# Patient Record
Sex: FEMALE | Race: WHITE | NOT HISPANIC OR LATINO | Employment: UNEMPLOYED | URBAN - METROPOLITAN AREA
[De-identification: names, ages, dates, MRNs, and addresses within clinical notes are randomized per-mention and may not be internally consistent; named-entity substitution may affect disease eponyms.]

---

## 2017-02-01 DIAGNOSIS — F32.A DEPRESSION, UNSPECIFIED DEPRESSION TYPE: ICD-10-CM

## 2017-02-01 RX ORDER — SERTRALINE HYDROCHLORIDE 50 MG/1
50 TABLET, FILM COATED ORAL DAILY
Qty: 30 TABLET | Refills: 1 | OUTPATIENT
Start: 2017-02-01

## 2018-01-22 ENCOUNTER — OFFICE VISIT (OUTPATIENT)
Dept: OBSTETRICS AND GYNECOLOGY | Facility: CLINIC | Age: 33
End: 2018-01-22
Attending: OBSTETRICS & GYNECOLOGY
Payer: COMMERCIAL

## 2018-01-22 VITALS
SYSTOLIC BLOOD PRESSURE: 108 MMHG | DIASTOLIC BLOOD PRESSURE: 60 MMHG | HEIGHT: 67 IN | BODY MASS INDEX: 20.41 KG/M2 | WEIGHT: 130.06 LBS

## 2018-01-22 DIAGNOSIS — Z01.419 ENCOUNTER FOR GYNECOLOGICAL EXAMINATION (GENERAL) (ROUTINE) WITHOUT ABNORMAL FINDINGS: ICD-10-CM

## 2018-01-22 DIAGNOSIS — Z12.4 CERVICAL CANCER SCREENING: Primary | ICD-10-CM

## 2018-01-22 DIAGNOSIS — Z11.51 SCREENING FOR HUMAN PAPILLOMAVIRUS (HPV): ICD-10-CM

## 2018-01-22 PROCEDURE — 88175 CYTOPATH C/V AUTO FLUID REDO: CPT

## 2018-01-22 PROCEDURE — 87624 HPV HI-RISK TYP POOLED RSLT: CPT

## 2018-01-22 PROCEDURE — 99395 PREV VISIT EST AGE 18-39: CPT | Mod: S$GLB,,, | Performed by: OBSTETRICS & GYNECOLOGY

## 2018-01-22 PROCEDURE — 99999 PR PBB SHADOW E&M-EST. PATIENT-LVL III: CPT | Mod: PBBFAC,,, | Performed by: OBSTETRICS & GYNECOLOGY

## 2018-01-22 RX ORDER — BUTALBITAL, ACETAMINOPHEN AND CAFFEINE 50; 325; 40 MG/1; MG/1; MG/1
TABLET ORAL
COMMUNITY
Start: 2017-11-02 | End: 2018-04-15 | Stop reason: SDUPTHER

## 2018-01-22 NOTE — PROGRESS NOTES
Subjective:       Patient ID: Tali Brice is a 32 y.o. female.    Chief Complaint:  Well Woman (last pap 8/15 neg )      Patient Active Problem List   Diagnosis    Anxiety       History of Present Illness  32 y.o. yo No obstetric history on file. here for annual exam. New patient old file. Moved to [place] so changed to doctor over there and then changed back. Had 2 children with no issues. Has not been able to get pregnant since. Tried Clomid/Femara with other MD with no success. Pt is ovulatory on her own. Normal SA and I did HSG a while ago that was normal, one side may have been partially blocked per pt. She also has been complaining about a GI pain around her period. She has been having this complaint for years. Now seeing GI and they did EGD and it was normal. Having test this Friday for gastroparesis. Pain is better on OCP, but wants pregnancy. Off OCP now. Pt is maybe interested in progesterone only OCP in the future. Does not want to do any other fertility meds right now. Answered all questions.      I explained new pap and HPV guidelines. Will do pap and HPV test today. Will repeat pap and HPV every 3 years. Answered all questions. Patient agrees.     Past Medical History:   Diagnosis Date    Anxiety     Hx of chlamydia infection        Past Surgical History:   Procedure Laterality Date     SECTION      x2     LASER LAPAROSCOPY      for dysmenorrhea pelvic pain   scope was normal     UPPER GASTROINTESTINAL ENDOSCOPY         OB History   No data available       Patient's last menstrual period was 2018 (exact date).   Date of Last Pap: No result found    Review of Systems  Review of Systems   Constitutional: Negative for fatigue and unexpected weight change.   Respiratory: Negative for shortness of breath.    Cardiovascular: Negative for chest pain.   Gastrointestinal: Negative for abdominal pain, constipation, diarrhea, nausea and vomiting.   Genitourinary: Negative for  dysuria.   Musculoskeletal: Negative for back pain.   Skin: Negative for rash.   Neurological: Negative for headaches.   Hematological: Does not bruise/bleed easily.   Psychiatric/Behavioral: Negative for behavioral problems.        Objective:   Physical Exam:   Constitutional: She is oriented to person, place, and time. Vital signs are normal. She appears well-developed and well-nourished. No distress.        Pulmonary/Chest: She exhibits no mass. Right breast exhibits no mass, no nipple discharge, no skin change, no tenderness, no bleeding and no swelling. Left breast exhibits no mass, no nipple discharge, no skin change, no tenderness, no bleeding and no swelling. Breasts are symmetrical.        Abdominal: Soft. Normal appearance and bowel sounds are normal. She exhibits no distension and no mass. There is no tenderness. There is no rebound.     Genitourinary: Vagina normal and uterus normal. There is no rash, tenderness, lesion or injury on the right labia. There is no rash, tenderness, lesion or injury on the left labia. Uterus is not deviated, not enlarged, not fixed, not tender, not hosting fibroids and not experiencing uterine prolapse. Cervix is normal. Right adnexum displays no mass, no tenderness and no fullness. Left adnexum displays no mass, no tenderness and no fullness. No erythema, tenderness, rectocele, cystocele or unspecified prolapse of vaginal walls in the vagina. No vaginal discharge found. Cervix exhibits no motion tenderness, no discharge and no friability.           Musculoskeletal: Normal range of motion and moves all extremeties.      Lymphadenopathy:     She has no axillary adenopathy.        Right: No supraclavicular adenopathy present.        Left: No supraclavicular adenopathy present.    Neurological: She is alert and oriented to person, place, and time.    Skin: Skin is warm and dry.    Psychiatric: She has a normal mood and affect. Her behavior is normal. Judgment normal.         Assessment/ Plan:     1. Cervical cancer screening  Liquid-based pap smear, screening   2. Screening for human papillomavirus (HPV)  HPV High Risk Genotypes, PCR   3. Encounter for gynecological examination (general) (routine) without abnormal findings         Follow-up with me in 1 year

## 2018-01-25 LAB
HPV16 AG SPEC QL: NEGATIVE
HPV16+18+H RISK 12 DNA CVX-IMP: NEGATIVE
HPV18 DNA SPEC QL NAA+PROBE: NEGATIVE

## 2018-04-06 ENCOUNTER — PATIENT MESSAGE (OUTPATIENT)
Dept: OBSTETRICS AND GYNECOLOGY | Facility: CLINIC | Age: 33
End: 2018-04-06

## 2018-04-18 ENCOUNTER — PATIENT MESSAGE (OUTPATIENT)
Dept: OBSTETRICS AND GYNECOLOGY | Facility: CLINIC | Age: 33
End: 2018-04-18

## 2019-02-04 ENCOUNTER — OFFICE VISIT (OUTPATIENT)
Dept: OBSTETRICS AND GYNECOLOGY | Facility: CLINIC | Age: 34
End: 2019-02-04
Payer: COMMERCIAL

## 2019-02-04 VITALS
HEIGHT: 67 IN | DIASTOLIC BLOOD PRESSURE: 80 MMHG | BODY MASS INDEX: 20.28 KG/M2 | SYSTOLIC BLOOD PRESSURE: 102 MMHG | WEIGHT: 129.19 LBS

## 2019-02-04 DIAGNOSIS — Z01.419 ENCOUNTER FOR GYNECOLOGICAL EXAMINATION (GENERAL) (ROUTINE) WITHOUT ABNORMAL FINDINGS: Primary | ICD-10-CM

## 2019-02-04 PROCEDURE — 99999 PR PBB SHADOW E&M-EST. PATIENT-LVL III: ICD-10-PCS | Mod: PBBFAC,,, | Performed by: OBSTETRICS & GYNECOLOGY

## 2019-02-04 PROCEDURE — 99395 PR PREVENTIVE VISIT,EST,18-39: ICD-10-PCS | Mod: S$GLB,,, | Performed by: OBSTETRICS & GYNECOLOGY

## 2019-02-04 PROCEDURE — 99395 PREV VISIT EST AGE 18-39: CPT | Mod: S$GLB,,, | Performed by: OBSTETRICS & GYNECOLOGY

## 2019-02-04 PROCEDURE — 99999 PR PBB SHADOW E&M-EST. PATIENT-LVL III: CPT | Mod: PBBFAC,,, | Performed by: OBSTETRICS & GYNECOLOGY

## 2019-02-04 NOTE — PROGRESS NOTES
Subjective:       Patient ID: Tali Brice is a 33 y.o. female.    Chief Complaint:  Annual Exam (last pap/hpv 2018 normal, c/o right side of pelvic area)      Patient Active Problem List   Diagnosis    Anxiety       History of Present Illness  33 y.o. yo  here for annual exam. Still has her epigastric pain with period that lasts a couple of days. Is better than it has been in the past but still there. Also has a pain in right side of labia where crease of leg is, worse with squats.   Has heavy period for 2 days then dark spotting for over a week that is barely enough for a pantyliner.     Patient had a normal pap smear and HPV in 2018 at last annual visit. I explained new guidelines. Will repeat pap and HPV every 3 years. Answered all questions. Patient agrees.     Past Medical History:   Diagnosis Date    Anxiety     Hx of chlamydia infection        Past Surgical History:   Procedure Laterality Date     SECTION      x2     LASER LAPAROSCOPY      for dysmenorrhea pelvic pain   scope was normal     UPPER GASTROINTESTINAL ENDOSCOPY         OB History   No data available       Patient's last menstrual period was 2019 (exact date).   Date of Last Pap: 2018    Review of Systems  Review of Systems   Constitutional: Negative for fatigue and unexpected weight change.   Respiratory: Negative for shortness of breath.    Cardiovascular: Negative for chest pain.   Gastrointestinal: Negative for abdominal pain, constipation, diarrhea, nausea and vomiting.   Genitourinary: Negative for dysuria.   Musculoskeletal: Negative for back pain.   Skin: Negative for rash.   Neurological: Negative for headaches.   Hematological: Does not bruise/bleed easily.   Psychiatric/Behavioral: Negative for behavioral problems.        Objective:   Physical Exam:   Constitutional: She is oriented to person, place, and time. Vital signs are normal. She appears well-developed and well-nourished. No distress.         Pulmonary/Chest: She exhibits no mass. Right breast exhibits no mass, no nipple discharge, no skin change, no tenderness, no bleeding and no swelling. Left breast exhibits no mass, no nipple discharge, no skin change, no tenderness, no bleeding and no swelling. Breasts are symmetrical.        Abdominal: Soft. Normal appearance and bowel sounds are normal. She exhibits no distension and no mass. There is no tenderness. There is no rebound.     Genitourinary: Vagina normal and uterus normal. There is no rash, tenderness, lesion or injury on the right labia. There is no rash, tenderness, lesion or injury on the left labia. Uterus is not deviated, not enlarged, not fixed, not tender, not hosting fibroids and not experiencing uterine prolapse. Cervix is normal. Right adnexum displays no mass, no tenderness and no fullness. Left adnexum displays no mass, no tenderness and no fullness. No erythema, tenderness, rectocele, cystocele or unspecified prolapse of vaginal walls in the vagina. No vaginal discharge found. Cervix exhibits no motion tenderness, no discharge and no friability.           Musculoskeletal: Normal range of motion and moves all extremeties.      Lymphadenopathy:     She has no axillary adenopathy.        Right: No supraclavicular adenopathy present.        Left: No supraclavicular adenopathy present.    Neurological: She is alert and oriented to person, place, and time.    Skin: Skin is warm and dry.    Psychiatric: She has a normal mood and affect. Her behavior is normal. Judgment normal.        Assessment/ Plan:     1. Encounter for gynecological examination (general) (routine) without abnormal findings       Where she was pointing for vaginal pain was lateral to the right Bartholin's area. Says it is better with NSAIDs and worse with exercise and squats, likely muscular. Possibly related to Bartholin's although exam is normal.   Declines any meds for periods.   Follow-up with me in 1 year

## 2019-04-09 ENCOUNTER — PATIENT MESSAGE (OUTPATIENT)
Dept: OBSTETRICS AND GYNECOLOGY | Facility: CLINIC | Age: 34
End: 2019-04-09

## 2019-04-10 RX ORDER — FLUCONAZOLE 150 MG/1
150 TABLET ORAL DAILY
Qty: 3 TABLET | Refills: 0 | Status: SHIPPED | OUTPATIENT
Start: 2019-04-10 | End: 2019-04-13

## 2019-04-10 NOTE — TELEPHONE ENCOUNTER
Len pt- c/o vaginal itching, thinks she has a yeast infection. Would like an Rx called in to pharm.     Allergies and pharm utd  Diflucan pended

## 2019-07-28 ENCOUNTER — PATIENT MESSAGE (OUTPATIENT)
Dept: OBSTETRICS AND GYNECOLOGY | Facility: CLINIC | Age: 34
End: 2019-07-28

## 2019-07-29 RX ORDER — DROSPIRENONE AND ETHINYL ESTRADIOL 0.02-3(28)
1 KIT ORAL DAILY
Qty: 112 TABLET | Refills: 4 | Status: SHIPPED | OUTPATIENT
Start: 2019-07-29 | End: 2020-02-17 | Stop reason: SDUPTHER

## 2019-08-06 ENCOUNTER — OFFICE VISIT (OUTPATIENT)
Dept: NEUROSURGERY | Facility: CLINIC | Age: 34
End: 2019-08-06
Payer: COMMERCIAL

## 2019-08-06 VITALS — HEART RATE: 61 BPM | DIASTOLIC BLOOD PRESSURE: 70 MMHG | SYSTOLIC BLOOD PRESSURE: 105 MMHG

## 2019-08-06 DIAGNOSIS — R93.7 ABNORMAL X-RAY OF THORACIC SPINE: ICD-10-CM

## 2019-08-06 DIAGNOSIS — R93.7 ABNORMAL X-RAY OF CERVICAL SPINE: ICD-10-CM

## 2019-08-06 DIAGNOSIS — G95.89 SYRINX, PERSISTENT CENTRAL CANAL: Primary | ICD-10-CM

## 2019-08-06 PROCEDURE — 99999 PR PBB SHADOW E&M-EST. PATIENT-LVL III: ICD-10-PCS | Mod: PBBFAC,,, | Performed by: STUDENT IN AN ORGANIZED HEALTH CARE EDUCATION/TRAINING PROGRAM

## 2019-08-06 PROCEDURE — 99203 OFFICE O/P NEW LOW 30 MIN: CPT | Mod: S$GLB,,, | Performed by: STUDENT IN AN ORGANIZED HEALTH CARE EDUCATION/TRAINING PROGRAM

## 2019-08-06 PROCEDURE — 99203 PR OFFICE/OUTPT VISIT, NEW, LEVL III, 30-44 MIN: ICD-10-PCS | Mod: S$GLB,,, | Performed by: STUDENT IN AN ORGANIZED HEALTH CARE EDUCATION/TRAINING PROGRAM

## 2019-08-06 PROCEDURE — 99999 PR PBB SHADOW E&M-EST. PATIENT-LVL III: CPT | Mod: PBBFAC,,, | Performed by: STUDENT IN AN ORGANIZED HEALTH CARE EDUCATION/TRAINING PROGRAM

## 2019-08-06 NOTE — LETTER
August 6, 2019      Lacy Mathis MD  [address] [place]  [place] [address]           [place] [facility]  [address] [facility] [address] [place] [address]  Phone: [phone]  Fax: [phone]          Patient: Tali Brice   MR Number: [id number]   YOB: 1985   Date of Visit: 8/6/2019       Dear Dr. Lacy Mathis:    Thank you for referring Tali Brice to me for evaluation. Attached you will find relevant portions of my assessment and plan of care.    If you have questions, please do not hesitate to call me. I look forward to following Tali Brice along with you.    Sincerely,    Pawan Lebron MD    Enclosure  CC:  No Recipients    If you would like to receive this communication electronically, please contact [email or web address] or [phone] to request more information on [employer] access.    For providers and/or their staff who would like to refer a patient to [facility], please contact us through our one-stop-shop provider referral line, [facility], at [phone].    If you feel you have received this communication in error or would no longer like to receive these types of communications, please e-mail [email or web address]

## 2019-08-06 NOTE — PROGRESS NOTES
[place] - Neurosurgery  Clinic Consult     Consult Requested By: Lacy Mathis MD  PCP: Lacy Mathis MD    SUBJECTIVE:     Chief Complaint:   Chief Complaint   Patient presents with    Mid-back Pain     patient reports pain began Oct 2018 when she fell on wet steps; pain 2/10       History of Present Illness:  Tali Brice is a 34 y.o. female who presents for evaluation of back pain. Patient reports she fell on concrete steps in 2018 with persistent back pain. Denies leg pain, numbness, paresthesias, weakness. Denies changes in gait. Denies bowel/bladder dysfunction. She had MRI thoracic spine for evaluation of back pain and was referred to neurosurgery to review results. She reports diffuse back pain which fluctuates in intensity. She is currently in PT without relief.       Past Medical History:   Diagnosis Date    Anxiety     Hx of chlamydia infection      Past Surgical History:   Procedure Laterality Date     SECTION      x2     LASER LAPAROSCOPY      for dysmenorrhea pelvic pain   scope was normal     UPPER GASTROINTESTINAL ENDOSCOPY       Family History   Problem Relation Age of Onset    Stroke Mother     No Known Problems Father     Breast cancer Maternal Grandmother     Colon cancer Neg Hx     Diabetes Neg Hx     Hypertension Neg Hx     Ovarian cancer Neg Hx      Social History     Tobacco Use    Smoking status: Former Smoker     Last attempt to quit: 2010     Years since quittin.6    Smokeless tobacco: Never Used   Substance Use Topics    Alcohol use: Yes     Alcohol/week: 0.6 oz     Types: 1 Glasses of wine per week     Frequency: 4 or more times a week     Drinks per session: 1 or 2     Binge frequency: Never     Comment: daily    Drug use: No      Review of patient's allergies indicates:  No Known Allergies    Current Outpatient Medications:     butalbital-acetaminophen-caffeine -40 mg (FIORICET, ESGIC) -40 mg per tablet, TAKE 1  TABLET BY MOUTH EVERY 6 HOURS AS NEEDED FOR HEADACHES, Disp: 20 tablet, Rfl: 10    drospirenone-ethinyl estradiol (KANDIS) 3-0.02 mg per tablet, Take 1 tablet by mouth once daily. Pt skips placebo pills., Disp: 112 tablet, Rfl: 4    HYDROcodone-acetaminophen (NORCO) 5-325 mg per tablet, Take 1 tablet by mouth every 8 (eight) hours as needed for Pain., Disp: 21 tablet, Rfl: 0    naproxen (NAPROSYN) 500 MG tablet, TAKE 1 TABLET BY MOUTH TWICE A DAY AS NEEDED, Disp: 60 tablet, Rfl: 5    tiZANidine (ZANAFLEX) 4 MG tablet, Take 1 tablet (4 mg total) by mouth every 8 (eight) hours., Disp: 60 tablet, Rfl: 0    ALPRAZolam (XANAX) 0.25 MG tablet, Take 1 tablet (0.25 mg total) by mouth daily as needed for Anxiety., Disp: 20 tablet, Rfl: 0    Review of Systems:   Constitutional: no fever, chills or night sweats. No changes in weight   Eyes: no visual changes   ENT: no nasal congestion or sore throat   Respiratory: no cough or shortness of breath   Cardiovascular: no chest pain or palpitations   Gastrointestinal: no nausea or vomiting   Genitourinary: no hematuria or dysuria   Integument/Breast: no rash or pruritis   Hematologic/Lymphatic: no easy bruising or lymphadenopathy   Musculoskeletal: +back pain   Neurological: no seizures or tremors   Behavioral/Psych: no auditory or visual hallucinations   Endocrine: no heat or cold intolerance         OBJECTIVE:     Vital Signs (Most Recent):  Pulse: 61 (08/06/19 0945)  BP: 105/70 (08/06/19 0945)    Physical Exam:   General: well developed, well nourished, no distress.   Neurologic: Alert and oriented. Thought content appropriate. GCS 15.   Head: normocephalic, atraumatic  Eyes: EOMI.  Neck: trachea midline, no JVD   Cardiovascular: no LE edema  Pulmonary: normal respirations, no signs of respiratory distress  Abdomen: non-distended  Sensory: intact to light touch throughout  Skin: Skin is warm, dry and intact.    Motor Strength: Moves all extremities spontaneously with good tone.  No abnormal movements seen.     Strength  Deltoids Triceps Biceps Wrist Extension Wrist Flexion Hand  Interossei   Upper: R 5/5 5/5 5/5 5/5 5/5 5/5 5/5    L 5/5 5/5 5/5 5/5 5/5 5/5 5/5     Iliopsoas Quadriceps Knee  Flexion Tibialis  anterior Gastro- cnemius EHL    Lower: R 5/5 5/5 5/5 5/5 5/5 5/5     L 5/5 5/5 5/5 5/5 5/5 5/5      DTR's: 2 + and symmetric in UE and LE  Clonus: absent  Gait: normal    Tandem Gait: No difficulty           Able to walk on heels & toes  Back: mildly TTP diffusely           Diagnostic Results:  I have independently reviewed the following imaging:  MRI: Reviewed  MRI reviewed without contrast which shows evidence of a dilated central canal throughout the thoracic spinal cord,  greatest diameter near the mid thoracic area.  There is no edema or cord signal change.  Seems to diffusely the present into the cervical spine.  This is without contrast there are no additional findings other than a vertebral hemangioma    ASSESSMENT/PLAN:     Syrinx, persistent central canal    Abnormal x-ray of cervical spine  -     MRI Cervical Spine W WO Cont; Future; Expected date: 08/06/2019    Abnormal x-ray of thoracic spine  -     MRI Thoracic Spine W WO Contrast; Future; Expected date: 08/06/2019        Tali Brice is a 34 y.o. female  Her clinic clinical pain is thoracic paraspinal back pain, with spasms which she is managed conservatively  She is is MRI of the thoracic spine without contrast consistent with a dilated cervical thoracic central canal greatest at the midthoracic spine, without intramedullary abnormalities  This is likely congenital; however the cervical spine is not well demarcated  We had a thorough discussion with her  to management options the likely diagnosis  Will obtain a cervical and thoracic MRI with without contrast to rule out any secondary etiology, which is unlikely however she is 34 years old with a new finding will plan on 3 months to have compared of  imaging    Patient verbalized understanding of plan. Encouraged to call with any questions or concerns.     This note was partially dictated using voice recognition software, so please excuse any errors that were not corrected.

## 2019-08-20 DIAGNOSIS — R93.7 ABNORMAL X-RAY OF THORACIC SPINE: ICD-10-CM

## 2019-08-20 DIAGNOSIS — G95.89 SYRINX, PERSISTENT CENTRAL CANAL: Primary | ICD-10-CM

## 2019-11-01 ENCOUNTER — OFFICE VISIT (OUTPATIENT)
Dept: PAIN MEDICINE | Facility: CLINIC | Age: 34
End: 2019-11-01
Payer: COMMERCIAL

## 2019-11-01 VITALS
BODY MASS INDEX: 19.55 KG/M2 | HEIGHT: 68 IN | SYSTOLIC BLOOD PRESSURE: 109 MMHG | RESPIRATION RATE: 18 BRPM | HEART RATE: 70 BPM | TEMPERATURE: 97 F | DIASTOLIC BLOOD PRESSURE: 57 MMHG | WEIGHT: 129 LBS | OXYGEN SATURATION: 100 %

## 2019-11-01 DIAGNOSIS — M79.18 MYOFASCIAL MUSCLE PAIN: ICD-10-CM

## 2019-11-01 DIAGNOSIS — G89.29 CHRONIC MIDLINE THORACIC BACK PAIN: ICD-10-CM

## 2019-11-01 DIAGNOSIS — G95.0 SYRINX OF SPINAL CORD: ICD-10-CM

## 2019-11-01 DIAGNOSIS — M51.36 DDD (DEGENERATIVE DISC DISEASE), LUMBAR: Primary | ICD-10-CM

## 2019-11-01 DIAGNOSIS — M47.816 LUMBAR SPONDYLOSIS: ICD-10-CM

## 2019-11-01 DIAGNOSIS — M54.6 CHRONIC MIDLINE THORACIC BACK PAIN: ICD-10-CM

## 2019-11-01 PROCEDURE — 99205 PR OFFICE/OUTPT VISIT, NEW, LEVL V, 60-74 MIN: ICD-10-PCS | Mod: S$GLB,,, | Performed by: ANESTHESIOLOGY

## 2019-11-01 PROCEDURE — 3008F BODY MASS INDEX DOCD: CPT | Mod: CPTII,S$GLB,, | Performed by: ANESTHESIOLOGY

## 2019-11-01 PROCEDURE — 99999 PR PBB SHADOW E&M-EST. PATIENT-LVL IV: CPT | Mod: PBBFAC,,, | Performed by: ANESTHESIOLOGY

## 2019-11-01 PROCEDURE — 3008F PR BODY MASS INDEX (BMI) DOCUMENTED: ICD-10-PCS | Mod: CPTII,S$GLB,, | Performed by: ANESTHESIOLOGY

## 2019-11-01 PROCEDURE — 99205 OFFICE O/P NEW HI 60 MIN: CPT | Mod: S$GLB,,, | Performed by: ANESTHESIOLOGY

## 2019-11-01 PROCEDURE — 99999 PR PBB SHADOW E&M-EST. PATIENT-LVL IV: ICD-10-PCS | Mod: PBBFAC,,, | Performed by: ANESTHESIOLOGY

## 2019-11-01 NOTE — LETTER
November 2, 2019      Frank Weaver MD  [address]  [place] [address]           [place] [facility]  [address] [facility] [address]  Phone: [phone]  Fax: [phone]          Patient: Tali Brice   MR Number: [id number]   YOB: 1985   Date of Visit: 11/1/2019       Dear Dr. Frank Weaver:    Thank you for referring Tali Brice to me for evaluation. Attached you will find relevant portions of my assessment and plan of care.    If you have questions, please do not hesitate to call me. I look forward to following Tali Brice along with you.    Sincerely,    Ozzy Cuenca MD    Enclosure  CC:  No Recipients    If you would like to receive this communication electronically, please contact [email or web address] or [phone] to request more information on [employer] access.    For providers and/or their staff who would like to refer a patient to [facility], please contact us through our one-stop-shop provider referral line, [facility], at [phone].    If you feel you have received this communication in error or would no longer like to receive these types of communications, please e-mail [email or web address]

## 2019-11-01 NOTE — PROGRESS NOTES
This note was completed with dictation software and grammatical errors may exist.    CC: Back pain    HPI:  The patient is a 34-year-old woman with a history of anxiety, presents in referral from Dr. Weaver for back pain.  The patient reports falling on some concrete steps in October 2018 after which she complained of mid and low back pain. She reports having 2 separate areas of pain. The 1st is between her shoulder blades and points to an area from about T10 up to T6.  She states that this area of pain is severe, sharp, throbbing and is usually aggravated by activity, especially after exercising for several days straight.  She states that then it will start hurting for several days afterwards.  It may resolve for several days at a time. She denies any radiation into her chest, no numbness or tingling in her chest. Her other area of pain is in her low back and she points to an area around L3.  States that this is more constant, dull, aching but she can have spasms in that area.  She states that this is more commonly the pain that she has on a regular basis. She denies any radiation into the legs but does report that it has caused tightness out to the buttocks and hips.  She reports the pain as aching, burning, grabbing, tight, numb, shooting and hot.  It is worse with prolonged sitting or standing, bending or lifting.  She states that nothing helps other than time and it may gradually improve.  She reports having tried NSAIDs, hydrocodone, muscle relaxants without much relief.    She was seen by Neurosurgery recently, Dr. Lebron who reviewed her thoracic spine MRI which shows a dilated cervical and thoracic central canal, unclear if this is just congenital.  This MRI was done without contrast, Dr. Lebron has recommended ache contrast MRI and three-month follow-up.    Pain intervention history:  She reports going to physical therapy at [facility] for about 5 months where she underwent dry needling, massage.   She reports that she has previously exercised on an almost daily basis and now she can only exercise for several days a week and usually has much increased pain afterwards.  She has tried hydrocodone and Zanaflex without much relief.    ROS:  She reports back pain only.  Balance of review of systems is negative.    Past Medical History:   Diagnosis Date    Anxiety     Hx of chlamydia infection        Past Surgical History:   Procedure Laterality Date     SECTION      x2     LASER LAPAROSCOPY      for dysmenorrhea pelvic pain   scope was normal     UPPER GASTROINTESTINAL ENDOSCOPY         Social History     Socioeconomic History    Marital status:      Spouse name: Ludwig    Number of children: 2    Years of education: Not on file    Highest education level: Not on file   Occupational History    Occupation: homemaker   Social Needs    Financial resource strain: Not hard at all    Food insecurity:     Worry: Never true     Inability: Never true    Transportation needs:     Medical: No     Non-medical: No   Tobacco Use    Smoking status: Former Smoker     Last attempt to quit: 2010     Years since quittin.8    Smokeless tobacco: Never Used   Substance and Sexual Activity    Alcohol use: Yes     Alcohol/week: 1.0 standard drinks     Types: 1 Glasses of wine per week     Frequency: 4 or more times a week     Drinks per session: 1 or 2     Binge frequency: Never     Comment: daily    Drug use: No    Sexual activity: Yes     Partners: Male     Birth control/protection: None   Lifestyle    Physical activity:     Days per week: 1 day     Minutes per session: 30 min    Stress: Not at all   Relationships    Social connections:     Talks on phone: More than three times a week     Gets together: Once a week     Attends [employer]ism service: Not on file     Active member of club or organization: No     Attends meetings of clubs or organizations: Patient refused     Relationship status:  "   Other Topics Concern    Not on file   Social History Narrative    Not on file         Medications/Allergies: See med card    Vitals:    11/01/19 0914   BP: (!) 109/57   Pulse: 70   Resp: 18   Temp: 96.9 °F (36.1 °C)   TempSrc: Oral   SpO2: 100%   Weight: 58.5 kg (128 lb 15.5 oz)   Height: 5' 8" (1.727 m)   PainSc:   4   PainLoc: Back         Physical exam:  Gen: A and O x3, pleasant, well-groomed  Skin: No rashes or obvious lesions  HEENT: PERRLA, no obvious deformities on ears or in canals. Trachea midline.  CVS: Regular rate and rhythm, normal palpable pulses.  Resp:No increased work of breathing, symmetrical chest rise.  Abdomen: Soft, NT/ND.  Musculoskeletal: Able to heel walk, toe walk. No antalgic gait.  No apparent scoliosis.    Neuro:  Lower extremities: 5/5 strength bilaterally  Reflexes: Patellar 2+, Achilles 2+ bilaterally.  Sensory:  Intact and symmetrical to light touch and pinprick in L2-S1 dermatomes bilaterally.    Lumbar spine:  Lumbar spine:  Range of motion is full with flexion and extension, oblique extension with report of decreased pain with forward flexion, increased pain in the mid lumbar region and midthoracic region with extension, some relief of pain with flexion in both of those regions.    Twan's test causes no increased pain on either side.    Supine straight leg raise is negative bilaterally.    Internal and external rotation of the hip causes no increased pain on either side.  Myofascial exam:  Mild tenderness in the lumbar paraspinous musculature.    Imaging:  MRI thoracic spine outside institution 07/19/2019:  Imaging without contrast. There is normal alignment of the thoracic spine.  There is no spondylolisthesis, disc heights all appear normal, signal intensities all appear equal with high-intensity showing adequate hydration.  There is no spondylolisthesis, no significant facet arthropathy.  There does appear to be a persistent central canal within the cord noted " to be from the cervical region all the way down to about T12.  There do not seem to be any areas that appear to have greater collections.    10/390/18 Xray L-spine:  Five lumbar type vertebral bodies.  Normal alignment.  No acute fracture.  Vertebral body heights are maintained.  Disc spaces are maintained.  Minimal endplate degenerative changes.  No prevertebral soft tissue abnormality.    Assessment:  The patient is a 34-year-old woman with a history of anxiety, presents in referral from Dr. Weaver for back pain.  1. DDD (degenerative disc disease), lumbar  MRI Lumbar Spine Without Contrast   2. Lumbar spondylosis  MRI Lumbar Spine Without Contrast   3. Syrinx of spinal cord     4. Myofascial muscle pain     5. Chronic midline thoracic back pain         Plan:  1.  We reviewed her thoracic MRI and I explained that I do not see anything that would necessarily relate to her fall and pain. While she does have what appears to be a persistent spinal central canal, I encouraged her to go ahead and get the MRI of the cervical spine and thoracic spine with contrast just to make sure there is nothing actually causing the syrinx.  2.  We discussed that this syrinx is unrelated to her trauma and we are discussing her back pain as a separate issue.  It is somewhat difficult to say what is causing her pain, I think it may be a combination of myofascial pain but also she does seem to have some facetogenic type pain in the lumbar region.  I explained that normally there should be more physical therapy, strengthening and stretching to help myofascial pain but it sounds like she has done this for an extended period of time without much relief.  For the low back, since she is having facet type pain and has slight disc height loss at L5/S1, I am going to get an MRI of her lumbar spine and after I have seen the other MRIs I will contact her.  We discussed possibly considering medial branch blocks for diagnostic purposes and I would  approach at L2/3, L3/4 and L4/5.  3.  If her pain does not seem to be responsive to medial branch blocks, we may set her up with further trigger point injections and consider sending her to a different physical therapy program.    Greater than 50% of this 60min visit was spent educating and counseling this patient.

## 2019-11-14 ENCOUNTER — TELEPHONE (OUTPATIENT)
Dept: PAIN MEDICINE | Facility: CLINIC | Age: 34
End: 2019-11-14

## 2019-11-14 DIAGNOSIS — R93.7 ABNORMAL X-RAY OF THORACIC SPINE: ICD-10-CM

## 2019-11-14 NOTE — TELEPHONE ENCOUNTER
Spoke with patient about results, and she will call and schedule her other two mri's with [facility].

## 2019-11-14 NOTE — TELEPHONE ENCOUNTER
Please let the patient know I reviewed her lumbar spine MRI and it is almost completely normal which is good but obviously doesn't help us with answers. However, I will call her on Monday to discuss our plan. Also find out if she was getting the MRIs Dr Lebron had recommended. Let her know that I'm at a conference. Thanks so much

## 2019-11-19 ENCOUNTER — TELEPHONE (OUTPATIENT)
Dept: PAIN MEDICINE | Facility: CLINIC | Age: 34
End: 2019-11-19

## 2019-11-19 NOTE — TELEPHONE ENCOUNTER
I spent time speaking to the patient and explained that her lumbar spine did not show any disc or facet issues, no scoliosis.  I explained that I think her pain is mostly muscular and I would like to set her up with a new physical therapy place.  I will place orders to the [facility] in [place], please contact the patient and make sure this is set up and also set her up with a follow-up in about 4 weeks.    The order would not allow me to set up, I am going to send another message with the order.

## 2019-11-20 ENCOUNTER — TELEPHONE (OUTPATIENT)
Dept: PAIN MEDICINE | Facility: HOSPITAL | Age: 34
End: 2019-11-20

## 2019-11-20 DIAGNOSIS — M54.6 MIDLINE THORACIC BACK PAIN, UNSPECIFIED CHRONICITY: ICD-10-CM

## 2019-11-20 DIAGNOSIS — M79.18 MYOFASCIAL PAIN: Primary | ICD-10-CM

## 2019-11-27 ENCOUNTER — CLINICAL SUPPORT (OUTPATIENT)
Dept: REHABILITATION | Facility: HOSPITAL | Age: 34
End: 2019-11-27
Attending: ANESTHESIOLOGY
Payer: COMMERCIAL

## 2019-11-27 DIAGNOSIS — M54.6 CHRONIC BILATERAL THORACIC BACK PAIN: ICD-10-CM

## 2019-11-27 DIAGNOSIS — M62.81 MUSCLE WEAKNESS: ICD-10-CM

## 2019-11-27 DIAGNOSIS — G89.29 CHRONIC BILATERAL THORACIC BACK PAIN: ICD-10-CM

## 2019-11-27 DIAGNOSIS — G89.29 CHRONIC BILATERAL LOW BACK PAIN WITHOUT SCIATICA: Primary | ICD-10-CM

## 2019-11-27 DIAGNOSIS — M54.50 CHRONIC BILATERAL LOW BACK PAIN WITHOUT SCIATICA: Primary | ICD-10-CM

## 2019-11-27 PROCEDURE — 97110 THERAPEUTIC EXERCISES: CPT | Mod: PN | Performed by: PHYSICAL THERAPIST

## 2019-11-27 PROCEDURE — 97161 PT EVAL LOW COMPLEX 20 MIN: CPT | Mod: PN | Performed by: PHYSICAL THERAPIST

## 2019-11-27 NOTE — PLAN OF CARE
[facility]  Physical Therapy Initial Evaluation    Name: Tali Brice  Clinic Number: [id number]    Therapy Diagnosis:   Encounter Diagnoses   Name Primary?    Chronic bilateral low back pain without sciatica Yes    Chronic bilateral thoracic back pain     Muscle weakness      Physician: Ozzy Cuenca MD    Physician Orders: PT Eval and Treat   Medical Diagnosis from Referral:  M79.18 (ICD-10-CM) - Myofascial pain    M54.6 (ICD-10-CM) - Midline thoracic back pain, unspecified chronicity  Evaluation Date: 2019  Authorization Period Expiration: 19  Plan of Care Expiration: 20  Visit # / Visits authorized:   MD Follow up appointment: 20    Time In: 9:05  Time Out: 10:00  Total Billable Time: 55 minutes    Precautions: Standard    Subjective   Date of onset: 1 year ago  History of current condition - Tali reports: she slipped and fell and landed on her back Pain in lumbar region feels like a charley horse.  Pt states when she gets upper back pain it is broad diffuse pain and does not occur together, usually one or the other.  Pt points to T-12 area as area of impact with fall.  Pain comes and goes generally 2-3 times a week.  Pt just joined [employer] and exercise in heat and has been 4-5 times and no pain.  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    Medications:   Tali has a current medication list which includes the following prescription(s): alprazolam, butalbital-acetaminophen-caffeine -40 mg, drospirenone-ethinyl estradiol, hydrocodone-acetaminophen, naproxen, and tizanidine.    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 Sleeping position: side and stomach    Pts goals: stop the pain, back to normal    Objective     Postural examination in standing:  - normal lumbar lordosis  - forward head  - forward shoulders  - winging scapular  - R hip high  - L shoulder high    Postural examination in sitting:   - normal lumbar lordosis  - forward head  - forward shoulders      Functional assessment:   - walking: No gait deficits were noted.  Patient able to toe and heel walk.    - sit to stand: no deficits  - sit to supine: no deficits       - supine to sit: no deficits  - supine to prone: no deficits    Lumbar active range of motion in standing is:  - flexion - mid calf                     - extension -  100% some pain in back                         - left side bending -  To knee feels tight         - right side bending -  To knee feels tight          Pelvic positioning: AR R     Flexibility testing:  - hamstrings:     90/90 test R 10 L 10           -  "gastrocnemius:   DF ankle R 10 degrees L 10 degrees    Muscle Strength  MMT R L   Hip flexion 4/5 4/5   Hip abduction 4+/5 4/5   Hip extension 5/5 4/5   Glut max 5/5 4-/5        Knee extension 5/5 5/5   Knee flexion 5/5 5/5   Ankle dorsiflexion 5/5 5/5   Ankle plantar flexion 5/5 5/5     Lumbar Special tests:  SLR neg    Palpation: severe TTP R QL, no significant piriformis/gluts    Joint mobility: lumbar WNL    Sensation: Intact  Reflexes: +1 knees and ankles      CMS Impairment/Limitation/Restriction for FOTO lumbar Survey    Therapist reviewed FOTO scores for Tali Brice on 11/27/2019.   FOTO documents entered into [employer] - see Media section.    Limitation Score: 50%         TREATMENT   Treatment Time In: 9:40  Treatment Time Out: 10:00  Total Treatment time separate from Evaluation: 20 minutes    Tali received therapeutic exercises to develop core stabilization for 15 minutes including:  Performed push/pull with pt and realigned, initially no change in symptoms  Push/pull x 3    Instructed pt in spinal and pelvic girdle anatomy and biomechanics and effect of sitting unsupported on pelvic girdle and LB      Tali received the following manual therapy techniques: Soft tissue Mobilization were applied to the: R QL for 5 minutes, including:  STM and Kinesiotape with 6" star for pain relief was performed over the R QL.  Instructed pt in use, care and precautions with tape.      Instructed pt to ice as needed    Home Exercises and Patient Education Provided    Education provided:   - HEP, spine and pelvic girdle anatomy and biomechanics    Written Home Exercises Provided: yes.  Exercises were reviewed and Tali was able to demonstrate them prior to the end of the session.  Tali demonstrated good  understanding of the education provided.     See EMR under Patient Instructions for exercises provided 11/27/2019.    Assessment   Tali is a 34 y.o. female referred to outpatient Physical Therapy with a medical " diagnosis of myofascial pain and back pain. Pt presents with back pain pelvic dysfunction and decreased strength    Pt prognosis is Good.   Pt will benefit from skilled outpatient Physical Therapy to address the deficits stated above and in the chart below, provide pt/family education, and to maximize pt's level of independence.     Plan of care discussed with patient: Yes  Pt's spiritual, cultural and educational needs considered and patient is agreeable to the plan of care and goals as stated below:     Anticipated Barriers for therapy: none    Medical Necessity is demonstrated by the following  History  Co-morbidities and personal factors that may impact the plan of care Co-morbidities:   none    Personal Factors:   no deficits     low   Examination  Body Structures and Functions, activity limitations and participation restrictions that may impact the plan of care Body Regions:   back  lower extremities  trunk    Body Systems:    ROM  strength    Participation Restrictions:   Household activities, sleep    Activity limitations:   Learning and applying knowledge  no deficits    General Tasks and Commands  no deficits    Communication  no deficits    Mobility  lifting and carrying objects    Self care  no deficits    Domestic Life  shopping  cooking  doing house work (cleaning house, washing dishes, laundry)  assisting others    Interactions/Relationships  no deficits    Life Areas  no deficits    Community and Social Life  no deficits         moderate   Clinical Presentation evolving clinical presentation with changing clinical characteristics moderate   Decision Making/ Complexity Score: low     GOALS:   Short Term Goals:  3 weeks  Increase strength 1/2 muscle grade  Improve postural awareness of pelvis to independently identify dysfunction with min assist from PT  Be able to perform HEP with minimal cueing required      Long Term Goals: 6 weeks  Improve muscle strength 1 muscle grade  Improve muscle strength  with MMT to 4+/5 to 5/5  Improve and stabilize proper pelvic positioning  Restore ability to work as  without increased pain  Restore normal sleep habits without disturbances due to pain  Restore ability to perform ADL's and household activities independently and without increased pain    Plan   Plan of care Certification: 11/27/2019 to 1-8-20.    Outpatient Physical Therapy 2 times weekly for 6 weeks to include the following interventions: Therapeutic exercises, manual therapy, neuromuscular re-education, therapeutic activities, modalities such as moist heat, ice, ultrasound and electrical stimulation, lumbar mechanical traction and dry needling will be considered and utilized as needed    Manisha Desouza, PT

## 2019-11-29 ENCOUNTER — CLINICAL SUPPORT (OUTPATIENT)
Dept: REHABILITATION | Facility: HOSPITAL | Age: 34
End: 2019-11-29
Payer: COMMERCIAL

## 2019-11-29 DIAGNOSIS — G89.29 CHRONIC BILATERAL LOW BACK PAIN WITHOUT SCIATICA: ICD-10-CM

## 2019-11-29 DIAGNOSIS — G89.29 CHRONIC BILATERAL THORACIC BACK PAIN: ICD-10-CM

## 2019-11-29 DIAGNOSIS — M62.81 MUSCLE WEAKNESS: ICD-10-CM

## 2019-11-29 DIAGNOSIS — M54.50 CHRONIC BILATERAL LOW BACK PAIN WITHOUT SCIATICA: ICD-10-CM

## 2019-11-29 DIAGNOSIS — M54.6 CHRONIC BILATERAL THORACIC BACK PAIN: ICD-10-CM

## 2019-11-29 PROCEDURE — 97110 THERAPEUTIC EXERCISES: CPT | Mod: PN | Performed by: PHYSICAL THERAPIST

## 2019-11-29 PROCEDURE — 97140 MANUAL THERAPY 1/> REGIONS: CPT | Mod: PN | Performed by: PHYSICAL THERAPIST

## 2019-11-29 NOTE — PROGRESS NOTES
"  Physical Therapy Daily Treatment Note     Name: Tali Dubois [facility]  Clinic Number: [id number]    Therapy Diagnosis:   Encounter Diagnoses   Name Primary?    Chronic bilateral low back pain without sciatica     Chronic bilateral thoracic back pain     Muscle weakness      Physician: Ozzy Cuenca MD    Visit Date: 11/29/2019    Physician Orders: PT Eval and Treat   Medical Diagnosis from Referral:  M79.18 (ICD-10-CM) - Myofascial pain    M54.6 (ICD-10-CM) - Midline thoracic back pain, unspecified chronicity  Evaluation Date: 11/27/2019  Authorization Period Expiration: 12-31-19  Plan of Care Expiration: 1-8-20  Visit # / Visits authorized: 2/ 20  MD Follow up appointment: 1-7-20       Time In: 1:10  Time Out: 1:50  Total Billable Time: 40 minutes    Precautions: Standard    Subjective     Pt reports: little pain in R side of back down to SI.  She was compliant with home exercise program.  Response to previous treatment: sore the day of treatment but not bad yesterday  Functional change: slept fine and moved around ok yesterdya    Pain: 1-2/10 after treatment 0.5/10  Location:bilateral low back and upper back  L will hurt more than R and upper back pain is mid to lower thoracic region     Objective     AR R     TREATMENT    Tali received therapeutic exercises to develop strength, endurance and core stabilization for 15 minutes including:  Push/pull x 3   SLR x 10 R fatigue quicker than L   Hip abd sidelying R fatigues quicker than L  Hip extension prone x 10   Cramp in HS with attempt at hip ext prone with bent knee so held  QL stretch sitting x 5 6 sec hold      Tali received the following manual therapy techniques: Soft tissue Mobilization were applied to the: R QL for 25 minutes, including:  STM  QL and lumbar paraspinals R, contract and Kinesiotape with 6" star for pain relief was performed over the R QL.  Instructed pt in use, care and precautions with tape.      Pt instructed to heat or ice as " needed    Home Exercises Provided and Patient Education Provided     Education provided:   - HEP, Instructed pt in increased awareness of symptoms.  Instructed pt in push/pull at onset of increased symptoms.        Written Home Exercises Provided: yes.  Exercises were reviewed and Tali was able to demonstrate them prior to the end of the session.  Tali demonstrated good  understanding of the education provided.     See EMR under Patient Instructions for exercises provided at initial eval and  11/29/2019.    Assessment     Pt with improved symptoms overall and handled additional manual therapy well..  Patient appears to understand increased awareness of symptoms and to perform push/pull at onset of increased symptoms.      Tali is progressing well towards her goals.   Pt prognosis is Good.     Pt will continue to benefit from skilled outpatient physical therapy to address the deficits listed in the problem list box on initial evaluation, provide pt/family education and to maximize pt's level of independence in the home and community environment.     Pt's spiritual, cultural and educational needs considered and pt agreeable to plan of care and goals.     Anticipated barriers to physical therapy: none    GOALS:   Short Term Goals:  3 weeks  Increase strength 1/2 muscle grade  Improve postural awareness of pelvis to independently identify dysfunction with min assist from PT  Be able to perform HEP with minimal cueing required        Long Term Goals: 6 weeks  Improve muscle strength 1 muscle grade  Improve muscle strength with MMT to 4+/5 to 5/5  Improve and stabilize proper pelvic positioning  Restore ability to work as  without increased pain  Restore normal sleep habits without disturbances due to pain  Restore ability to perform ADL's and household activities independently and without increased pain    Plan   Continue with established plan of care towards PT goals.      Progress strengthening as  tolerated    Manisha Desouza, PT

## 2019-12-02 ENCOUNTER — CLINICAL SUPPORT (OUTPATIENT)
Dept: REHABILITATION | Facility: HOSPITAL | Age: 34
End: 2019-12-02
Payer: COMMERCIAL

## 2019-12-02 DIAGNOSIS — G89.29 CHRONIC BILATERAL THORACIC BACK PAIN: ICD-10-CM

## 2019-12-02 DIAGNOSIS — M54.50 CHRONIC BILATERAL LOW BACK PAIN WITHOUT SCIATICA: ICD-10-CM

## 2019-12-02 DIAGNOSIS — G89.29 CHRONIC BILATERAL LOW BACK PAIN WITHOUT SCIATICA: ICD-10-CM

## 2019-12-02 DIAGNOSIS — M62.81 MUSCLE WEAKNESS: ICD-10-CM

## 2019-12-02 DIAGNOSIS — M54.6 CHRONIC BILATERAL THORACIC BACK PAIN: ICD-10-CM

## 2019-12-02 PROCEDURE — 97140 MANUAL THERAPY 1/> REGIONS: CPT | Mod: PN | Performed by: PHYSICAL THERAPIST

## 2019-12-02 PROCEDURE — 97110 THERAPEUTIC EXERCISES: CPT | Mod: PN | Performed by: PHYSICAL THERAPIST

## 2019-12-02 PROCEDURE — 97035 APP MDLTY 1+ULTRASOUND EA 15: CPT | Mod: PN | Performed by: PHYSICAL THERAPIST

## 2019-12-02 NOTE — PROGRESS NOTES
Physical Therapy Daily Treatment Note     Name: Tali Brice  Clinic Number: [id number]    Therapy Diagnosis:   Encounter Diagnoses   Name Primary?    Chronic bilateral low back pain without sciatica     Chronic bilateral thoracic back pain     Muscle weakness      Physician: Ozzy Cuenca MD    Visit Date: 12/2/2019    Physician Orders: PT Eval and Treat   Medical Diagnosis from Referral:  M79.18 (ICD-10-CM) - Myofascial pain    M54.6 (ICD-10-CM) - Midline thoracic back pain, unspecified chronicity  Evaluation Date: 11/27/2019  Authorization Period Expiration: 12-31-19  Plan of Care Expiration: 1-8-20  Visit # / Visits authorized: 3/ 20  MD Follow up appointment: 1-7-20       Time In: 9:05  Time Out: 10:05  Total Billable Time: 58 minutes    Precautions: Standard    Subjective     Pt reports: Friday 4-5 in R SI region most of that day.  Pt did [place] lights Saturday on house and lots of pain and foam rolling helps  Sunday was worse than Saturday and foam rolling helped Pt states feels sore with cat stretch.  Pt states no pain at rest.  Pt states she has been doing her exercises. Pt gradually moved ex up to 14 reps with ex Pt states tape started to irritate her so she removed.   She was compliant with home exercise program.  Response to previous treatment: sore the day of treatment but not bad yesterday  Functional change: slept fine and moved around ok yesterday and so far today    Pain: 1-2/10 after treatment 0/10  Location:bilateral low back and upper back  L will hurt more than R and upper back pain is mid to lower thoracic region     Objective     AR R tender at R SI    Muscle Strength  MMT R L   Elbow flexion 5/5 5/5   Elbow extension 5/5 5/5   Shoulder flexion 4+/5 4+/5   Shoulder abduction 4+/5 4+/5   Shoulder external rotation 4+/5 4+/5   Shoulder internal rotation 5/5 5/5        Upper trap 5/5 5/5   Middle trap 3+/5 3+/5   Lower trap 4-/5 4-/5   Rhomboids 3+/5 3+/5          TREATMENT    Tali received therapeutic exercises to develop strength, endurance and core stabilization for 30 minutes including:  Push/pull x 3   SLR x 15  Hip abd sidelying x 15   Arm lift prone x 10   Single side T lift x 10 R 4 L, then felt cramping in shoulder blade L  Hip extension prone x 15    Hip ext prone with bent knee x 10  QL stretch sitting x 5 6 sec hold  Push/pull x 3    During treatment pt realized in dysfunction, performed push/pull and realigned.    Instructed pt in role of scapula muscles and how prolonged overhead activity such as hanging outdoor [place] lights could affect pt.    Educated further on pelvic girdle and spinal anatomy and biomechanics       Tali received the following manual therapy techniques: Soft tissue Mobilization were applied to the: R QL for 20 minutes, including:  STM  QL and lumbar paraspinals R, STM thoracic paraspinals and rhomboids        Ultrasound  for pain control and to decrease inflammation @ continuous duty cycle, 1 Mhz, applied to T4-7 paraspinals and rhomboids , intensity = 1.5 w/cm2 for 8 minutes.    Pt instructed to heat or ice as needed    Home Exercises Provided and Patient Education Provided     Education provided:   - HEP, Instructed pt in anatomy and biomechanics as noted above, delayed onset of soreness      Written Home Exercises Provided: yes.  Exercises were reviewed and Tali was able to demonstrate them prior to the end of the session.  Tali demonstrated good  understanding of the education provided.     See EMR under Patient Instructions for exercises provided at initial eval and  11/29/2019, 12-2-19.    Assessment     Pt with improved symptoms after therapy.  Pt's pain Friday was in area of R SI joint and may be related to adjusting to new pelvic positioning, improved today and pt with improving ability to know when in dysfunction. Pt understands prolonged overhead activity could cause symptoms she had over weekend for upper back and  will benefit from further strengthening.       Tali is progressing well towards her goals.   Pt prognosis is Good.     Pt will continue to benefit from skilled outpatient physical therapy to address the deficits listed in the problem list box on initial evaluation, provide pt/family education and to maximize pt's level of independence in the home and community environment.     Pt's spiritual, cultural and educational needs considered and pt agreeable to plan of care and goals.     Anticipated barriers to physical therapy: none    GOALS:   Short Term Goals:  3 weeks  Increase strength 1/2 muscle grade  Improve postural awareness of pelvis to independently identify dysfunction with min assist from PT  Be able to perform HEP with minimal cueing required        Long Term Goals: 6 weeks  Improve muscle strength 1 muscle grade  Improve muscle strength with MMT to 4+/5 to 5/5  Improve and stabilize proper pelvic positioning  Restore ability to work as  without increased pain  Restore normal sleep habits without disturbances due to pain  Restore ability to perform ADL's and household activities independently and without increased pain    Plan   Continue with established plan of care towards PT goals.      Progress strengthening as tolerated    Manisha Desouza, PT

## 2019-12-03 NOTE — PROGRESS NOTES
Physical Therapy Daily Treatment Note     Name: Tali Dubois [facility]  Clinic Number: [id number]    Therapy Diagnosis:   Encounter Diagnoses   Name Primary?    Chronic bilateral low back pain without sciatica     Chronic bilateral thoracic back pain     Muscle weakness      Physician: Ozzy Cuenca MD    Visit Date: 12/4/2019    Physician Orders: PT Eval and Treat   Medical Diagnosis from Referral:  M79.18 (ICD-10-CM) - Myofascial pain    M54.6 (ICD-10-CM) - Midline thoracic back pain, unspecified chronicity  Evaluation Date: 11/27/2019  Authorization Period Expiration: 12-31-19  Plan of Care Expiration: 1-8-20  Visit # / Visits authorized: 4/ 20  MD Follow up appointment: 1-7-20       Time In: 9:05  Time Out: 10:10  Total Billable Time: 60 minutes    Precautions: Standard    Subjective     Pt reports: feeling more tight and some neck stiffness and low back when had R side and did push/pull and helped.  Pt states helped it not get worse and relieved for short time. No pain just tight Pt felt US did not do much, but heat does help  She was compliant with home exercise program.  Response to previous treatment: sore the day of treatment but not bad yesterday  Functional change: slept fine and moved around ok yesterday and so far today    Pain: 0/10 after treatment 0/10, feels looser  Location:bilateral low back and upper back  L will hurt more than R and upper back pain is mid to lower thoracic region     Objective     Level pelvis to start      TREATMENT    Tali received therapeutic exercises to develop strength, endurance and core stabilization for 30 minutes including:  Push/pull x 3   SLR x 20, SLR L more fatigue last night than today R felt more fatigue L today  Hip abd sidelying x 20  Arm lift prone x 15 R harder than L  Single side T lift x 10 R 4 L, then felt cramping in shoulder blade L  Hip extension prone with pillow x 20  Hip ext prone with pillow with  bent knee x 20  QL stretch sitting x 5 6 sec  hold  Push/pull x 3    During treatment pt realized in dysfunction, performed push/pull and realigned.    Instructed pt further in awareness of symptoms and keep up with activities.  Reviewed pt diary and pt able to realize why some pain may occur next day after activity previous day      Tali received the following manual therapy techniques: Soft tissue Mobilization were applied to the: R QL for 15 minutes, including:  STM  QL and lumbar paraspinals R, STM thoracic paraspinals and rhomboids, upper trap B with contract relax upper trap which did lead to some relief      Patient received pre-mod electrical stimulation to decrease muscle tightness and pain to R and L T3-7 region for 15 minutes with MH to neck and upper backwith cycle time: continuous, beat frequency: , CC/CV: CV.        (NP)Ultrasound  for pain control and to decrease inflammation @ continuous duty cycle, 1 Mhz, applied to T4-7 paraspinals and rhomboids , intensity = 1.5 w/cm2 for 8 minutes.    Pt instructed to heat or ice as needed    Home Exercises Provided and Patient Education Provided     Education provided:   - HEP, Instructed pt in anatomy and biomechanics as noted above, delayed onset of soreness      Written Home Exercises Provided: yes.  Exercises were reviewed and Tali was able to demonstrate them prior to the end of the session.  Tali demonstrated good  understanding of the education provided.     See EMR under Patient Instructions for exercises provided at initial eval and  11/29/2019, 12-2-19.    Assessment   Pt keeping a dairy and understanding how pain is occurring.  Pt able to progress with strengthening and focus on increasing reps at this time as pt adjusts to program.  Pt with improved symptoms at end of session    Tali is progressing well towards her goals.   Pt prognosis is Good.     Pt will continue to benefit from skilled outpatient physical therapy to address the deficits listed in the problem list box on initial  evaluation, provide pt/family education and to maximize pt's level of independence in the home and community environment.     Pt's spiritual, cultural and educational needs considered and pt agreeable to plan of care and goals.     Anticipated barriers to physical therapy: none    GOALS:   Short Term Goals:  3 weeks  Increase strength 1/2 muscle grade  Improve postural awareness of pelvis to independently identify dysfunction with min assist from PT  Be able to perform HEP with minimal cueing required        Long Term Goals: 6 weeks  Improve muscle strength 1 muscle grade  Improve muscle strength with MMT to 4+/5 to 5/5  Improve and stabilize proper pelvic positioning  Restore ability to work as  without increased pain  Restore normal sleep habits without disturbances due to pain  Restore ability to perform ADL's and household activities independently and without increased pain    Plan   Continue with established plan of care towards PT goals.      Progress strengthening as tolerated    Manisha Desouza, PT

## 2019-12-04 ENCOUNTER — CLINICAL SUPPORT (OUTPATIENT)
Dept: REHABILITATION | Facility: HOSPITAL | Age: 34
End: 2019-12-04
Payer: COMMERCIAL

## 2019-12-04 DIAGNOSIS — M54.6 CHRONIC BILATERAL THORACIC BACK PAIN: ICD-10-CM

## 2019-12-04 DIAGNOSIS — M62.81 MUSCLE WEAKNESS: ICD-10-CM

## 2019-12-04 DIAGNOSIS — G89.29 CHRONIC BILATERAL THORACIC BACK PAIN: ICD-10-CM

## 2019-12-04 DIAGNOSIS — G89.29 CHRONIC BILATERAL LOW BACK PAIN WITHOUT SCIATICA: ICD-10-CM

## 2019-12-04 DIAGNOSIS — M54.50 CHRONIC BILATERAL LOW BACK PAIN WITHOUT SCIATICA: ICD-10-CM

## 2019-12-04 PROCEDURE — 97014 ELECTRIC STIMULATION THERAPY: CPT | Mod: PN | Performed by: PHYSICAL THERAPIST

## 2019-12-04 PROCEDURE — 97140 MANUAL THERAPY 1/> REGIONS: CPT | Mod: PN | Performed by: PHYSICAL THERAPIST

## 2019-12-04 PROCEDURE — 97110 THERAPEUTIC EXERCISES: CPT | Mod: PN | Performed by: PHYSICAL THERAPIST

## 2019-12-09 ENCOUNTER — CLINICAL SUPPORT (OUTPATIENT)
Dept: REHABILITATION | Facility: HOSPITAL | Age: 34
End: 2019-12-09
Payer: COMMERCIAL

## 2019-12-09 ENCOUNTER — PATIENT MESSAGE (OUTPATIENT)
Dept: PAIN MEDICINE | Facility: CLINIC | Age: 34
End: 2019-12-09

## 2019-12-09 DIAGNOSIS — M54.50 CHRONIC BILATERAL LOW BACK PAIN WITHOUT SCIATICA: ICD-10-CM

## 2019-12-09 DIAGNOSIS — G89.29 CHRONIC BILATERAL THORACIC BACK PAIN: ICD-10-CM

## 2019-12-09 DIAGNOSIS — K76.9 LESION OF LIVER: Primary | ICD-10-CM

## 2019-12-09 DIAGNOSIS — G89.29 CHRONIC BILATERAL LOW BACK PAIN WITHOUT SCIATICA: ICD-10-CM

## 2019-12-09 DIAGNOSIS — M54.6 CHRONIC BILATERAL THORACIC BACK PAIN: ICD-10-CM

## 2019-12-09 DIAGNOSIS — M62.81 MUSCLE WEAKNESS: ICD-10-CM

## 2019-12-09 PROCEDURE — 97140 MANUAL THERAPY 1/> REGIONS: CPT | Mod: PN | Performed by: PHYSICAL THERAPIST

## 2019-12-09 PROCEDURE — 97014 ELECTRIC STIMULATION THERAPY: CPT | Mod: PN | Performed by: PHYSICAL THERAPIST

## 2019-12-09 PROCEDURE — 97110 THERAPEUTIC EXERCISES: CPT | Mod: PN | Performed by: PHYSICAL THERAPIST

## 2019-12-09 NOTE — TELEPHONE ENCOUNTER
I spoke with the radiologist regarding the liver lesions and he states that we are not able to completely determine if these lesions are hemangiomas or not without an MRI of the liver.  I will get a liver MRI, please contact her to schedule.

## 2019-12-09 NOTE — PROGRESS NOTES
Physical Therapy Daily Treatment Note     Name: Tali Dubois [facility]  Clinic Number: [id number]    Therapy Diagnosis:   Encounter Diagnoses   Name Primary?    Chronic bilateral low back pain without sciatica     Chronic bilateral thoracic back pain     Muscle weakness      Physician: Ozzy Cuenca MD    Visit Date: 12/9/2019    Physician Orders: PT Eval and Treat   Medical Diagnosis from Referral:  M79.18 (ICD-10-CM) - Myofascial pain    M54.6 (ICD-10-CM) - Midline thoracic back pain, unspecified chronicity  Evaluation Date: 11/27/2019  Authorization Period Expiration: 12-31-19  Plan of Care Expiration: 1-8-20  Visit # / Visits authorized: 5/ 20  MD Follow up appointment: 1-7-20       Time In: 9:03  Time Out: 10:20  Total Billable Time: 60 minutes    Precautions: Standard    Subjective     Pt reports: went to [facility] for a competition for her son. Pt states she was so tight she was unsuccessful with push/pull, Upper back getting better.  Pt states her aunt was with her and she did lifting Pt states got R LB one day and L the next. Pt states back at home able to finally get level and decrease back pain. Pt states this AM feel out of line    She was compliant with home exercise program.  Response to previous treatment: sore the day of treatment but not bad yesterday  Functional change: slept fine and moved around ok yesterday and so far today    Pain: 1/10 after treatment 0/10, feels looser  Location:bilateral low back and upper back  L will hurt more than R and upper back pain is mid to lower thoracic region     Objective     AR R       TREATMENT    Tali received therapeutic exercises to develop strength, endurance and core stabilization for 30 minutes including:  Pt with pelvic dysfunction. Pt performed push/pull exercise and able to note positive change in symptoms.  Instructed pt further in increased awareness of symptoms.  Instructed pt again in need to perform push/pull at onset of increased  symptoms.    Push/pull x 3   SLR x 20, SLR L more fatigue last night than today R felt more fatigue L today  Hip abd sidelying x 20  Arm lift prone x 15 R harder than L  Single side T lift x 12 R 13 L, then felt cramping in shoulder blade L  Hip extension prone with pillow x 20  Hip ext prone with pillow with  bent knee x 20  QL stretch sitting x 5 6 sec hold  Push/pull x 3    After exercise still pain, appeared to overstretch with QL Pt with pelvic dysfunction and did not correct with push/pull.  Performed contract-relax to piriformis followed by push/pull which corrected dysfunction.  Pt was able to note a positive change in symptoms. Written instructions were provided to patient to be able to perform at home when push/pull alone does not work.          Tali received the following manual therapy techniques: Soft tissue Mobilization were applied to the: R QL for 15 minutes, including:  STM  QL and lumbar paraspinals R, and R gluteals/piriformis with focus at sacral border,  Contract relax R QL sidelying     Patient received pre-mod electrical stimulation to decrease muscle tightness and pain to R and L T3-7 region for 15 minutes with MH to neck and upper backwith cycle time: continuous, beat frequency: , CC/CV: CV.        (NP)Ultrasound  for pain control and to decrease inflammation @ continuous duty cycle, 1 Mhz, applied to T4-7 paraspinals and rhomboids , intensity = 1.5 w/cm2 for 8 minutes.    Pt instructed to heat or ice as needed    Home Exercises Provided and Patient Education Provided     Education provided:   - HEP, contract relax piriformis      Written Home Exercises Provided: yes.    Exercises were reviewed and Tali was able to demonstrate them prior to the end of the session.  Tali demonstrated good  understanding of the education provided.     See EMR under Patient Instructions for exercises provided at initial eval and  11/29/2019, 12-2-19. 12-9-19 contract relax piriformis    Assessment   Pt  had to go out of town for weekend and unable to exercise as should and with increased activity with walking led to increased pain and tightness making push/pull not as successful.  Now that at home, feeling better though continue with dysfunction and tightness and will benefit from resuming program. After ex push/pull was not working and able to realign with contract relax   Decreased tightness after treatment     Tali is progressing well towards her goals.   Pt prognosis is Good.     Pt will continue to benefit from skilled outpatient physical therapy to address the deficits listed in the problem list box on initial evaluation, provide pt/family education and to maximize pt's level of independence in the home and community environment.     Pt's spiritual, cultural and educational needs considered and pt agreeable to plan of care and goals.     Anticipated barriers to physical therapy: none    GOALS:   Short Term Goals:  3 weeks  Increase strength 1/2 muscle grade  Improve postural awareness of pelvis to independently identify dysfunction with min assist from PT  Be able to perform HEP with minimal cueing required        Long Term Goals: 6 weeks  Improve muscle strength 1 muscle grade  Improve muscle strength with MMT to 4+/5 to 5/5  Improve and stabilize proper pelvic positioning  Restore ability to work as  without increased pain  Restore normal sleep habits without disturbances due to pain  Restore ability to perform ADL's and household activities independently and without increased pain    Plan   Continue with established plan of care towards PT goals.      Progress strengthening as tolerated    Manisha Desouza, PT

## 2019-12-11 ENCOUNTER — CLINICAL SUPPORT (OUTPATIENT)
Dept: REHABILITATION | Facility: HOSPITAL | Age: 34
End: 2019-12-11
Payer: COMMERCIAL

## 2019-12-11 DIAGNOSIS — G89.29 CHRONIC BILATERAL THORACIC BACK PAIN: ICD-10-CM

## 2019-12-11 DIAGNOSIS — M62.81 MUSCLE WEAKNESS: ICD-10-CM

## 2019-12-11 DIAGNOSIS — G89.29 CHRONIC BILATERAL LOW BACK PAIN WITHOUT SCIATICA: ICD-10-CM

## 2019-12-11 DIAGNOSIS — M54.6 CHRONIC BILATERAL THORACIC BACK PAIN: ICD-10-CM

## 2019-12-11 DIAGNOSIS — M54.50 CHRONIC BILATERAL LOW BACK PAIN WITHOUT SCIATICA: ICD-10-CM

## 2019-12-11 PROCEDURE — 97110 THERAPEUTIC EXERCISES: CPT | Mod: PN | Performed by: PHYSICAL THERAPIST

## 2019-12-11 PROCEDURE — 97140 MANUAL THERAPY 1/> REGIONS: CPT | Mod: PN | Performed by: PHYSICAL THERAPIST

## 2019-12-11 NOTE — PROGRESS NOTES
Physical Therapy Daily Treatment Note     Name: Tali Dubois [facility]  Clinic Number: [id number]    Therapy Diagnosis:   Encounter Diagnoses   Name Primary?    Chronic bilateral low back pain without sciatica     Chronic bilateral thoracic back pain     Muscle weakness      Physician: Ozzy Cuenca MD    Visit Date: 12/11/2019    Physician Orders: PT Eval and Treat   Medical Diagnosis from Referral:  M79.18 (ICD-10-CM) - Myofascial pain    M54.6 (ICD-10-CM) - Midline thoracic back pain, unspecified chronicity  Evaluation Date: 11/27/2019  Authorization Period Expiration: 12-31-19  Plan of Care Expiration: 1-8-20  Visit # / Visits authorized: 5/ 20  MD Follow up appointment: 1-7-20       Time In: 9:03  Time Out: 10:00  Total Billable Time: 55 minutes    Precautions: Standard    Subjective     Pt reports: think low back was ok past couple of days, forgot notebook.  Pt states upper back more pain and had to do ironing 6/10 Pt rested got on heating pad and did ex and felt better.  Pt states woke up with no pain  She was compliant with home exercise program.  Response to previous treatment: sore the day of treatment but not bad yesterday  Functional change: slept fine and moved around ok yesterday and so far today    Pain: 0/10 after treatment 0/10, feels looser  Location:bilateral low back and upper back  L will hurt more than R and upper back pain is mid to lower thoracic region     Objective     Level pelvis  Increased winging of scapula with elevation and abd R>L After STM subscap and scap mob improved    TREATMENT    Tali received therapeutic exercises to develop strength, endurance and core stabilization for 40 minutes including:    Instructed pt further in biomechanics of shoulder, role of subscap and need to do more child's pose to stretch out subscap  Instructed pt may need to decrease amount of work in hot works to keep from overuse    SLR x 20,  Hip abd sidelying x 20  Arm lift prone x 15   Single side T  lift x 15  Hip extension prone with pillow x 20  Hip ext prone with pillow with  bent knee x 20  QL stretch sitting x 1 30 sec  Push/pull x 3  After stretching developed SI dysfunction and did not correct with push/pull, worked with pt further on contract relax and able to self mobilize and noted + change in symptoms     Pul[place]own with retraction with YTB x 5   Retraction with YTB x 10    Tali received the following manual therapy techniques: Soft tissue Mobilization and joint mob were applied to the: shoulder girdle B for 15 minutes, including: scap mob and subscap STM and lat dorsi STM and thoracic STM      (NP)Patient received pre-mod electrical stimulation to decrease muscle tightness and pain to R and L T3-7 region for 15 minutes with MH to neck and upper backwith cycle time: continuous, beat frequency: , CC/CV: CV.        (NP)Ultrasound  for pain control and to decrease inflammation @ continuous duty cycle, 1 Mhz, applied to T4-7 paraspinals and rhomboids , intensity = 1.5 w/cm2 for 8 minutes.    Pt instructed to heat or ice as needed    Home Exercises Provided and Patient Education Provided     Education provided:   - HEP, contract relax piriformis      Written Home Exercises Provided: yes.    Exercises were reviewed and Tali was able to demonstrate them prior to the end of the session.  Tali demonstrated good  understanding of the education provided.     See EMR under Patient Instructions for exercises provided at initial eval and  11/29/2019, 12-2-19. 12-9-19 contract relax piriformis, 12-11-19    Assessment   Pt with increased use of upper body after further discussion leading to increased pain and understands need to cut back on work out.  After discussion pt did upper body workout 2 days in a row to make up for when out of town.  Pt now appears to understand that was too much and led to increased pain.  Pt understands need for rest and PT ex Pt with no pain so held modalities today.    Tali is  progressing well towards her goals.   Pt prognosis is Good.     Pt will continue to benefit from skilled outpatient physical therapy to address the deficits listed in the problem list box on initial evaluation, provide pt/family education and to maximize pt's level of independence in the home and community environment.     Pt's spiritual, cultural and educational needs considered and pt agreeable to plan of care and goals.     Anticipated barriers to physical therapy: none    GOALS:   Short Term Goals:  3 weeks  Increase strength 1/2 muscle grade  Improve postural awareness of pelvis to independently identify dysfunction with min assist from PT  Be able to perform HEP with minimal cueing required        Long Term Goals: 6 weeks  Improve muscle strength 1 muscle grade  Improve muscle strength with MMT to 4+/5 to 5/5  Improve and stabilize proper pelvic positioning  Restore ability to work as  without increased pain  Restore normal sleep habits without disturbances due to pain  Restore ability to perform ADL's and household activities independently and without increased pain    Plan   Continue with established plan of care towards PT goals.      Progress strengthening as tolerated    Manisha Desouza, PT

## 2019-12-16 ENCOUNTER — CLINICAL SUPPORT (OUTPATIENT)
Dept: REHABILITATION | Facility: HOSPITAL | Age: 34
End: 2019-12-16
Payer: COMMERCIAL

## 2019-12-16 DIAGNOSIS — G89.29 CHRONIC BILATERAL LOW BACK PAIN WITHOUT SCIATICA: ICD-10-CM

## 2019-12-16 DIAGNOSIS — M54.6 CHRONIC BILATERAL THORACIC BACK PAIN: ICD-10-CM

## 2019-12-16 DIAGNOSIS — M62.81 MUSCLE WEAKNESS: ICD-10-CM

## 2019-12-16 DIAGNOSIS — G89.29 CHRONIC BILATERAL THORACIC BACK PAIN: ICD-10-CM

## 2019-12-16 DIAGNOSIS — M54.50 CHRONIC BILATERAL LOW BACK PAIN WITHOUT SCIATICA: ICD-10-CM

## 2019-12-16 PROCEDURE — 97035 APP MDLTY 1+ULTRASOUND EA 15: CPT | Mod: PN | Performed by: PHYSICAL THERAPIST

## 2019-12-16 PROCEDURE — 97110 THERAPEUTIC EXERCISES: CPT | Mod: PN | Performed by: PHYSICAL THERAPIST

## 2019-12-16 NOTE — PROGRESS NOTES
Physical Therapy Daily Treatment Note     Name: Tali Dubois [facility]  Clinic Number: [id number]    Therapy Diagnosis:   Encounter Diagnoses   Name Primary?    Chronic bilateral low back pain without sciatica     Chronic bilateral thoracic back pain     Muscle weakness      Physician: Ozzy Cuenca MD    Visit Date: 12/16/2019    Physician Orders: PT Eval and Treat   Medical Diagnosis from Referral:  M79.18 (ICD-10-CM) - Myofascial pain    M54.6 (ICD-10-CM) - Midline thoracic back pain, unspecified chronicity  Evaluation Date: 11/27/2019  Authorization Period Expiration: 12-31-19  Plan of Care Expiration: 1-8-20  Visit # / Visits authorized: 5/ 20  MD Follow up appointment: 1-7-20       Time In: 9:03  Time Out: 9:50  Total Billable Time:38  minutes    Precautions: Standard    Subjective     Pt reports: upper back has been fine, rested more   Pt states low back pain this weekend. Pt states Wed she had increased scapula pain.  Pt states she took meds and by Thursday much better.  Pt states over weekend no upper back pain Pt states she did crafts on Sunday which was when low back started but better today Thursday rested all day and Friday did lower body.  No pain presently  She was compliant with home exercise program.  Response to previous treatment: later that day upper back pain that appeared to be subscap spasm  Functional change: slept fine and moved around ok yesterday and so far today    Pain: 0/10 after treatment 0/10, feels looser  Location:bilateral low back and upper back  L will hurt more than R and upper back pain is mid to lower thoracic region     Objective     Slight pelvic dysfunction  Increased winging of scapula with elevation and abd R>L After STM subscap and scap mob improved    TREATMENT    Tali received therapeutic exercises to develop strength, endurance and core stabilization for 30 minutes including:    Performed push/pull and unable to note change in symptoms.  Unaware of when in  dysfunction so instructed to continue to  push/pull more frequently before prolonged activities of sitting or standing. .       SLR x 20,  Hip abd sidelying x 20  Arm lift prone x 15   Single side T lift x 15  Hip extension prone with pillow x 20  Hip ext prone with pillow with  bent knee x 20  QL stretch sitting x 1 30 sec  Push/pull x 3  After stretching developed SI dysfunction and did not correct with push/pull, worked with pt further on contract relax and able to self mobilize and noted + change in symptoms    Pulldown with retraction with YTB x 10  Retraction with YTB x 10  Fatigue at end     Reviewed pt work out and she will do iso for lower body today which is holding sustained for about 2 min wall sits, squats       NOT PERFORMING Tali received the following manual therapy techniques: Soft tissue Mobilization and joint mob were applied to the: shoulder girdle B for 15 minutes, including: scap mob and subscap STM and lat dorsi STM and thoracic STM      (NP)Patient received pre-mod electrical stimulation to decrease muscle tightness and pain to R and L T3-7 region for 15 minutes with MH to neck and upper backwith cycle time: continuous, beat frequency: , CC/CV: CV.        Ultrasound  for pain control and to decrease inflammation @ continuous duty cycle, 1 Mhz, applied to L T4-7 paraspinals and rhomboids , intensity = 1.5 w/cm2 for 8 minutes.    Pt instructed to heat or ice as needed    Home Exercises Provided and Patient Education Provided     Education provided:   - HEP, contract relax piriformis      Written Home Exercises Provided: yes.    Exercises were reviewed and Tali was able to demonstrate them prior to the end of the session.  Tali demonstrated good  understanding of the education provided.     See EMR under Patient Instructions for exercises provided at initial eval and  11/29/2019, 12-2-19. 12-9-19 contract relax piriformis, 12-11-19    Assessment   Pt with negative reaction to scap mob  and subscap STM Wed.  So will hold manual therapy to scapula Pt doing well with current HEP program though still fatigue with scap stab work improving in hip strengthening Pt understands to progress for endurace as tolerated   Tali is progressing well towards her goals.   Pt prognosis is Good.     Pt will continue to benefit from skilled outpatient physical therapy to address the deficits listed in the problem list box on initial evaluation, provide pt/family education and to maximize pt's level of independence in the home and community environment.     Pt's spiritual, cultural and educational needs considered and pt agreeable to plan of care and goals.     Anticipated barriers to physical therapy: none    GOALS:   Short Term Goals:  3 weeks  Increase strength 1/2 muscle grade  Improve postural awareness of pelvis to independently identify dysfunction with min assist from PT  Be able to perform HEP with minimal cueing required        Long Term Goals: 6 weeks  Improve muscle strength 1 muscle grade  Improve muscle strength with MMT to 4+/5 to 5/5  Improve and stabilize proper pelvic positioning  Restore ability to work as  without increased pain  Restore normal sleep habits without disturbances due to pain  Restore ability to perform ADL's and household activities independently and without increased pain    Plan   Continue with established plan of care towards PT goals.  See pt 2 more visits and then see pt on 1-6-20 prior to MD appt for reassessment    Progress strengthening as tolerated    Manisha Desouza, PT

## 2019-12-18 ENCOUNTER — CLINICAL SUPPORT (OUTPATIENT)
Dept: REHABILITATION | Facility: HOSPITAL | Age: 34
End: 2019-12-18
Payer: COMMERCIAL

## 2019-12-18 DIAGNOSIS — M54.6 CHRONIC BILATERAL THORACIC BACK PAIN: ICD-10-CM

## 2019-12-18 DIAGNOSIS — G89.29 CHRONIC BILATERAL THORACIC BACK PAIN: ICD-10-CM

## 2019-12-18 DIAGNOSIS — M54.50 CHRONIC BILATERAL LOW BACK PAIN WITHOUT SCIATICA: ICD-10-CM

## 2019-12-18 DIAGNOSIS — M62.81 MUSCLE WEAKNESS: ICD-10-CM

## 2019-12-18 DIAGNOSIS — G89.29 CHRONIC BILATERAL LOW BACK PAIN WITHOUT SCIATICA: ICD-10-CM

## 2019-12-18 PROCEDURE — 97035 APP MDLTY 1+ULTRASOUND EA 15: CPT | Mod: PN | Performed by: PHYSICAL THERAPIST

## 2019-12-18 PROCEDURE — 97110 THERAPEUTIC EXERCISES: CPT | Mod: PN | Performed by: PHYSICAL THERAPIST

## 2019-12-18 NOTE — PROGRESS NOTES
Physical Therapy Daily Treatment Note     Name: Tali Dubois [facility]  Clinic Number: [id number]    Therapy Diagnosis:   Encounter Diagnoses   Name Primary?    Chronic bilateral low back pain without sciatica     Chronic bilateral thoracic back pain     Muscle weakness      Physician: Ozzy Cuenca MD    Visit Date: 12/18/2019    Physician Orders: PT Eval and Treat   Medical Diagnosis from Referral:  M79.18 (ICD-10-CM) - Myofascial pain    M54.6 (ICD-10-CM) - Midline thoracic back pain, unspecified chronicity  Evaluation Date: 11/27/2019  Authorization Period Expiration: 12-31-19  Plan of Care Expiration: 1-8-20  Visit # / Visits authorized: 8/ 20  MD Follow up appointment: 1-7-20       Time In: 8:57  Time Out: 9:45  Total Billable Time: 43  minutes    Precautions: Standard    Subjective     Pt reports:.  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  Low back doing ok May have one episode of tightness which resolved, does not recall what she did .  She was compliant with home exercise program.  Response to previous treatment: later that day upper back pain that appeared to be subscap spasm  Functional change: slept fine and moved around ok yesterday and so far today    Pain: 0/10 after treatment 0/10, feels looser  Location:bilateral low back and upper back  L will hurt more than R and upper back pain is mid to lower thoracic region     Objective     Slight pelvic dysfunction  DO UPPER BODY MMT NEXT VISIT    Muscle Strength 12-18-19  MMT R L   Hip flexion 4+/5 4+/5   Hip abduction 5/5 4+/5   Hip extension 5/5 5-/5   Glut max 5/5 4/5       Muscle Strength initial eval  MMT R L   Hip flexion 4/5 4/5   Hip abduction 4+/5 4/5   Hip extension 5/5 4/5   Glut max 5/5 4-/5           Knee extension 5/5 5/5   Knee flexion 5/5 5/5   Ankle dorsiflexion 5/5 5/5   Ankle plantar flexion 5/5 5/5       TREATMENT    Tali received  therapeutic exercises to develop strength, endurance and core stabilization for 30 minutes including:    Provided info on posture bra     Performed push/pull and unable to note change in symptoms.  Unaware of when in dysfunction so instructed to continue to  push/pull more frequently before prolonged activities of sitting or standing. .       SLR x 20, L gets tired quicker  Hip abd sidelying x 20  Arm lift prone x 15   Single side T lift x 15  Hip extension prone with pillow x 20  Hip ext prone with pillow with  bent knee x 20  QL stretch sitting x 1 30 sec  Push/pull x 3  After stretching developed SI dysfunction and did not correct with push/pull, worked with pt further on contract relax and able to self mobilize and noted + change in symptoms    Pulldown with retraction with YTB x 15  Retraction with YTB x 12  Fatigue at end     Pt thinks she will do bar or Pilates today at [employer]     Pt requested not to perform taping  Pt states she has access to  Figure 8 bracing to pull shoulders back and will use tomorrow and note response.       Tali received the following manual therapy techniques: Soft tissue Mobilization  were applied to the: for 5 minutes, including: STM to iliopsoas L      (NP)Patient received pre-mod electrical stimulation to decrease muscle tightness and pain to R and L T3-7 region for 15 minutes with MH to neck and upper backwith cycle time: continuous, beat frequency: , CC/CV: CV.        Ultrasound  for pain control and to decrease inflammation @ continuous duty cycle, 1 Mhz, applied to L T4-7 paraspinals and rhomboids , intensity = 1.5 w/cm2 for 8 minutes.    Pt instructed to heat or ice as needed    Home Exercises Provided and Patient Education Provided     Education provided:   - HEP, contract relax piriformis      Written Home Exercises Provided: no    Exercises were reviewed and Tali was able to demonstrate them prior to the end of the session.  Tali demonstrated good  understanding  of the education provided.     See EMR under Patient Instructions for exercises provided at initial eval and  11/29/2019, 12-2-19. 12-9-19 contract relax piriformis, 12-11-19    Assessment   Pt will note response of figure 8 bracing.   Pt with improved LE strength and understands need to continue work on LE strengthening and increasing reps as tolerated for endurance.  When pt is in slight dysfunction she cannot tell when corrected, but reports there are times if she has pain she knows when push/pull did help.     Tali is progressing well towards her goals.   Pt prognosis is Good.     Pt will continue to benefit from skilled outpatient physical therapy to address the deficits listed in the problem list box on initial evaluation, provide pt/family education and to maximize pt's level of independence in the home and community environment.     Pt's spiritual, cultural and educational needs considered and pt agreeable to plan of care and goals.     Anticipated barriers to physical therapy: none    GOALS:   Short Term Goals:  3 weeks MET STG's  Increase strength 1/2 muscle grade  Improve postural awareness of pelvis to independently identify dysfunction with min assist from PT  Be able to perform HEP with minimal cueing required        Long Term Goals: 6 weeks progressing, not met  Improve muscle strength 1 muscle grade  Improve muscle strength with MMT to 4+/5 to 5/5  Improve and stabilize proper pelvic positioning  Restore ability to work as  without increased pain  Restore normal sleep habits without disturbances due to pain  Restore ability to perform ADL's and household activities independently and without increased pain    Plan   Continue with established plan of care towards PT goals.  See pt 1 more visit and then see pt on 1-6-20 prior to MD appt for reassessment  Reassess upper body on Monday    Progress strengthening as tolerated    Manisha Desouza, PT

## 2019-12-23 ENCOUNTER — CLINICAL SUPPORT (OUTPATIENT)
Dept: REHABILITATION | Facility: HOSPITAL | Age: 34
End: 2019-12-23
Payer: COMMERCIAL

## 2019-12-23 DIAGNOSIS — G89.29 CHRONIC BILATERAL LOW BACK PAIN WITHOUT SCIATICA: ICD-10-CM

## 2019-12-23 DIAGNOSIS — M54.50 CHRONIC BILATERAL LOW BACK PAIN WITHOUT SCIATICA: ICD-10-CM

## 2019-12-23 DIAGNOSIS — M54.6 CHRONIC BILATERAL THORACIC BACK PAIN: ICD-10-CM

## 2019-12-23 DIAGNOSIS — G89.29 CHRONIC BILATERAL THORACIC BACK PAIN: ICD-10-CM

## 2019-12-23 DIAGNOSIS — M62.81 MUSCLE WEAKNESS: ICD-10-CM

## 2019-12-23 PROCEDURE — 97110 THERAPEUTIC EXERCISES: CPT | Mod: PN | Performed by: PHYSICAL THERAPIST

## 2019-12-23 PROCEDURE — 97035 APP MDLTY 1+ULTRASOUND EA 15: CPT | Mod: PN | Performed by: PHYSICAL THERAPIST

## 2019-12-23 NOTE — PROGRESS NOTES
Physical Therapy Daily Treatment Note     Name: Tali Dubois [facility]  Clinic Number: [id number]    Therapy Diagnosis:   Encounter Diagnoses   Name Primary?    Chronic bilateral low back pain without sciatica     Chronic bilateral thoracic back pain     Muscle weakness      Physician: Ozzy Cuenca MD    Visit Date: 12/23/2019    Physician Orders: PT Eval and Treat   Medical Diagnosis from Referral:  M79.18 (ICD-10-CM) - Myofascial pain    M54.6 (ICD-10-CM) - Midline thoracic back pain, unspecified chronicity  Evaluation Date: 11/27/2019  Authorization Period Expiration: 12-31-19  Plan of Care Expiration: 1-8-20  Visit # / Visits authorized: 9/ 20  MD Follow up appointment: 1-7-20       Time In: 10:00  Time Out: 10:52  Total Billable Time: 43  minutes    Precautions: Standard    Subjective     Pt reports:.  Busy with holidays and did lower body and yoga and after felt ok.  Pt states little soreness in upper back but rested in PM and better today  So no pain today.  Unable to find Figure 8 shoulder brace, so will get one for hair  She was compliant with home exercise program.  Response to previous treatment: later that day upper back pain that appeared to be subscap spasm  Functional change: slept fine and moved around ok yesterday and so far today    Pain: 0/10 after treatment 0/10, feels looser  Location:bilateral low back and upper back  L will hurt more than R and upper back pain is mid to lower thoracic region     Objective     Slight pelvic dysfunction     Muscle Strength 12-23-19  MMT R L   Elbow flexion 5/5 5/5   Elbow extension 5/5 5/5   Shoulder flexion 5-/5 5-/5   Shoulder abduction 5-/5 5/5   Shoulder external rotation 5/5 5/5   Shoulder internal rotation 5/5 5/5        Upper trap 5/5 5/5   Middle trap 4-/5 4-/5   Lower trap 4/5 4/5   Rhomboids 4-/5 4-/5         Muscle Strength 12-2-19  MMT R L   Elbow flexion 5/5 5/5   Elbow extension 5/5 5/5   Shoulder flexion 4+/5 4+/5   Shoulder abduction 4+/5  4+/5   Shoulder external rotation 4+/5 4+/5   Shoulder internal rotation 5/5 5/5           Upper trap 5/5 5/5   Middle trap 3+/5 3+/5   Lower trap 4-/5 4-/5   Rhomboids 3+/5 3+/5         Muscle Strength 12-18-19  MMT R L   Hip flexion 4+/5 4+/5   Hip abduction 5/5 4+/5   Hip extension 5/5 5-/5   Glut max 5/5 4/5       Muscle Strength initial eval  MMT R L   Hip flexion 4/5 4/5   Hip abduction 4+/5 4/5   Hip extension 5/5 4/5   Glut max 5/5 4-/5           Knee extension 5/5 5/5   Knee flexion 5/5 5/5   Ankle dorsiflexion 5/5 5/5   Ankle plantar flexion 5/5 5/5       TREATMENT    Tali received therapeutic exercises to develop strength, endurance and core stabilization for 30 minutes including:    Performed push/pull and unable to note change in symptoms.  Unaware of when in dysfunction so instructed to continue to  push/pull more frequently before prolonged activities of sitting or standing. .       SLR x 20, L gets tired quicker  Hip abd sidelying x 20  Arm lift prone x 17   Single side T lift x 17  Hip extension prone with pillow x 20  Hip ext prone with pillow with  bent knee x 20  QL stretch sitting x 1 30 sec  Push/pull x 3  After stretching developed SI dysfunction and did not correct with push/pull, worked with pt further on contract relax and able to self mobilize and noted + change in symptoms    Pulldown with retraction with YTB x 17  Retraction with YTB x 10  Fatigue at end     Pt thinks she will Pilates today at [facility]        Tali received the following manual therapy techniques: Soft tissue Mobilization  were applied to the: for 5 minutes, including: STM to iliopsoas R      (NP)Patient received pre-mod electrical stimulation to decrease muscle tightness and pain to R and L T3-7 region for 15 minutes with MH to neck and upper backwith cycle time: continuous, beat frequency: , CC/CV: CV.        Ultrasound  for pain control and to decrease inflammation @ continuous duty cycle, 1 Mhz, applied to L  T4-7 paraspinals and rhomboids , intensity = 1.5 w/cm2 for 8 minutes.    Pt instructed to heat or ice as needed    Home Exercises Provided and Patient Education Provided     Education provided:   - HEP, contract relax piriformis      Written Home Exercises Provided: no    Exercises were reviewed and Tali was able to demonstrate them prior to the end of the session.  Tali demonstrated good  understanding of the education provided.     See EMR under Patient Instructions for exercises provided at initial eval and  11/29/2019, 12-2-19. 12-9-19 contract relax piriformis, 12-11-19    Assessment   Pt with increased shoulder girdle strength, but will benefit from more strengthening.  Pt appears I with HEP and understands to call if problem or exacerbation arises before next visit    Tali is progressing well towards her goals.   Pt prognosis is Good.     Pt will continue to benefit from skilled outpatient physical therapy to address the deficits listed in the problem list box on initial evaluation, provide pt/family education and to maximize pt's level of independence in the home and community environment.     Pt's spiritual, cultural and educational needs considered and pt agreeable to plan of care and goals.     Anticipated barriers to physical therapy: none    GOALS:   Short Term Goals:  3 weeks MET STG's  Increase strength 1/2 muscle grade  Improve postural awareness of pelvis to independently identify dysfunction with min assist from PT  Be able to perform HEP with minimal cueing required        Long Term Goals: 6 weeks progressing, not met  Improve muscle strength 1 muscle grade  Improve muscle strength with MMT to 4+/5 to 5/5  Improve and stabilize proper pelvic positioning  Restore ability to work as  without increased pain  Restore normal sleep habits without disturbances due to pain  Restore ability to perform ADL's and household activities independently and without increased pain    Plan   Continue  with established plan of care towards PT goals. Reassess next visit  Progress strengthening as tolerated    Manisha Desouza, PT

## 2020-01-03 NOTE — PROGRESS NOTES
Physical Therapy Progress Note     Name: Tali Brice  Clinic Number: [id number]    Therapy Diagnosis:   Encounter Diagnoses   Name Primary?    Chronic bilateral low back pain without sciatica     Chronic bilateral thoracic back pain     Muscle weakness      Physician: Ozzy Cuenca MD    Visit Date: 1/6/2020    Physician Orders: PT Eval and Treat   Medical Diagnosis from Referral:  M79.18 (ICD-10-CM) - Myofascial pain    M54.6 (ICD-10-CM) - Midline thoracic back pain, unspecified chronicity  Evaluation Date: 11/27/2019  Authorization Period Expiration: 12-31-20  Plan of Care Expiration: 2-17-20  Visit # / Visits authorized: 10/ 20  MD Follow up appointment: 1-7-20       Time In: 10:05  Time Out: 11:10  Total Billable Time: 53  minutes    Precautions: Standard    Subjective     Pt reports:.  Lower back is under control.  Pt states upper back has been bothering her lately.  Pt states she took Romy ornaments off tree that led to increased pain on Thurs Jan 2 or 3 .  Upper back has been hurting since then.  The brace does not allow to function when in brace cuts in arm.  Pt realizes she needs to get posture bra    She was compliant with home exercise program.  Response to previous treatment: later that day upper back pain that appeared to be subscap spasm  Functional change: slept fine and moved around ok yesterday and so far today    Pain: 8/10 by the end of activity 9/10 after treatment 0/10, feels looser  Location:bilateral low back and upper back  L will hurt more than R and upper back pain is mid to lower thoracic region     Objective       Muscle Strength 1-6-20  MMT R L   Elbow flexion 5/5 5/5   Elbow extension 5/5 5/5   Shoulder flexion 5-/5 5-/5   Shoulder abduction 5-/5 5-/5   Shoulder external rotation 5/5 5/5   Shoulder internal rotation 5/5 5/5        Upper trap 5/5 5/5   Middle trap 4-/5 4-/5   Lower trap 4-/5 4-/5   Rhomboids 4-/5 4-/5         Muscle Strength 12-23-19  MMT R L   Elbow  flexion 5/5 5/5   Elbow extension 5/5 5/5   Shoulder flexion 5-/5 5-/5   Shoulder abduction 5-/5 5/5   Shoulder external rotation 5/5 5/5   Shoulder internal rotation 5/5 5/5        Upper trap 5/5 5/5   Middle trap 4-/5 4-/5   Lower trap 4/5 4/5   Rhomboids 4-/5 4-/5         Muscle Strength 12-2-19  MMT R L   Elbow flexion 5/5 5/5   Elbow extension 5/5 5/5   Shoulder flexion 4+/5 4+/5   Shoulder abduction 4+/5 4+/5   Shoulder external rotation 4+/5 4+/5   Shoulder internal rotation 5/5 5/5           Upper trap 5/5 5/5   Middle trap 3+/5 3+/5   Lower trap 4-/5 4-/5   Rhomboids 3+/5 3+/5     Muscle Strength 1-6-20  MMT R L   Hip flexion 4+/5 5-/5   Hip abduction 5/5 5/5   Hip extension 5/5 5/5   Glut max 5-/5 4/5       Muscle Strength 12-18-19  MMT R L   Hip flexion 4+/5 4+/5   Hip abduction 5/5 4+/5   Hip extension 5/5 5-/5   Glut max 5/5 4/5       Muscle Strength initial eval  MMT R L   Hip flexion 4/5 4/5   Hip abduction 4+/5 4/5   Hip extension 5/5 4/5   Glut max 5/5 4-/5           Knee extension 5/5 5/5   Knee flexion 5/5 5/5   Ankle dorsiflexion 5/5 5/5   Ankle plantar flexion 5/5 5/5     Lumbar active range of motion in standing is:1-6-20  - flexion - to toes                      - extension -  100%                         - left side bending -  To knee         - right side bending -  To knee          Lumbar active range of motion in standing is: initial eval  - flexion - mid calf                     - extension -  100% some pain in back                         - left side bending -  To knee feels tight         - right side bending -  To knee feels tight           Pelvic positioning: level pelvis and I with self mobat IE AR R     Flexibility testing:  - hamstrings:     90/90 test R 10 L 10           - gastrocnemius:   DF ankle R 10 degrees L 10 degrees      Palpation: mod tenderness rhomboids and thoracic paraspinals no R QL tightness or tenderness at IE severe TTP R QL, no significant  piriformis/gluts     Joint mobility: lumbar WNL         CMS Impairment/Limitation/Restriction for FOTO lumbar Survey     Therapist reviewed FOTO scores for Tali Brice   FOTO documents entered into EPIC - see Media section.     Limitation Score: 48% at IE 50%            TREATMENT    Tali received therapeutic exercises to develop strength, endurance and core stabilization for 15 minutes including:    Instructed pt to hold therex for today and resume tomorrow if feeling better and hold work out.  Instructed pt to try and do arm and leg lift prone together and review proper performance.  SLR x 20, L gets tired quicker  Hip abd sidelying x 20  Arm lift prone x 17   Single side T lift x 17  Hip extension prone with pillow x 20  Hip ext prone with pillow with  bent knee x 20  QL stretch sitting x 1 30 sec  Push/pull x 3  After stretching developed SI dysfunction and did not correct with push/pull, worked with pt further on contract relax and able to self mobilize and noted + change in symptoms    Pulldown with retraction with YTB x 17  Retraction with YTB x 10  Fatigue at end            Tali received the following manual therapy techniques: Soft tissue Mobilization  were applied to the: for 15 minutes, including: STM to B thoracic paraspinals  Instructed pt in need to consider posture bra  Discussed bracing she was using.  Pt requested no taping due to skin irritation     Patient received pre-mod electrical stimulation to decrease muscle tightness and pain to R and L T3-7 region for 15 minutes with MH to neck and upper backwith cycle time: continuous, beat frequency: , CC/CV: CV.        Ultrasound  for pain control and to decrease inflammation @ continuous duty cycle, 1 Mhz, applied to L T4-7 paraspinals and rhomboids , intensity = 1.5 w/cm2 for 8 minutes.    Pt instructed to heat or ice as needed    Home Exercises Provided and Patient Education Provided     Education provided:   - HEP, contract relax  piriformis      Written Home Exercises Provided: no    Exercises were reviewed and Tali was able to demonstrate them prior to the end of the session.  Tali demonstrated good  understanding of the education provided.     See EMR under Patient Instructions for exercises provided at initial eval and  11/29/2019, 12-2-19. 12-9-19 contract relax piriformis, 12-11-19    Assessment   Pt has maintained strength from previous visit, but still gets upper back pain with prolonged upper body activity.  Pt will benefit from continued treatment to assist pt in fully achieving her goals    Tali is progressing well towards her goals.   Pt prognosis is Good.     Pt will continue to benefit from skilled outpatient physical therapy to address the deficits listed in the problem list box on initial evaluation, provide pt/family education and to maximize pt's level of independence in the home and community environment.     Pt's spiritual, cultural and educational needs considered and pt agreeable to plan of care and goals.     Anticipated barriers to physical therapy: none    GOALS:   Short Term Goals:  3 weeks MET STG's  Increase strength 1/2 muscle grade  Improve postural awareness of pelvis to independently identify dysfunction with min assist from PT  Be able to perform HEP with minimal cueing required        Long Term Goals: 6 weeks progressing, not met  Improve muscle strength 1 muscle grade  Improve muscle strength with MMT to 4+/5 to 5/5  Improve and stabilize proper pelvic positioning  Restore ability to work as  without increased pain  Restore normal sleep habits without disturbances due to pain  Restore ability to perform ADL's and household activities independently and without increased pain    Plan   If you concur, I recommend patient continue with physical therapy 2 times a week  for 4-6 weeks.  Please advise us of your  recommendations. Thank you for allowing us to assist in the care of your patient.       Manisha Desouza, MS, PT          I certify the need for these services furnished under this plan of treatment and while under my care.    ____________________________________ Physician/Referring Practitioner                                Date of Signature           Manisha Desouza, PT

## 2020-01-06 ENCOUNTER — CLINICAL SUPPORT (OUTPATIENT)
Dept: REHABILITATION | Facility: HOSPITAL | Age: 35
End: 2020-01-06
Payer: COMMERCIAL

## 2020-01-06 DIAGNOSIS — M54.6 CHRONIC BILATERAL THORACIC BACK PAIN: ICD-10-CM

## 2020-01-06 DIAGNOSIS — G89.29 CHRONIC BILATERAL LOW BACK PAIN WITHOUT SCIATICA: ICD-10-CM

## 2020-01-06 DIAGNOSIS — M54.50 CHRONIC BILATERAL LOW BACK PAIN WITHOUT SCIATICA: ICD-10-CM

## 2020-01-06 DIAGNOSIS — M62.81 MUSCLE WEAKNESS: ICD-10-CM

## 2020-01-06 DIAGNOSIS — G89.29 CHRONIC BILATERAL THORACIC BACK PAIN: ICD-10-CM

## 2020-01-06 PROCEDURE — 97140 MANUAL THERAPY 1/> REGIONS: CPT | Mod: PN | Performed by: PHYSICAL THERAPIST

## 2020-01-06 PROCEDURE — 97014 ELECTRIC STIMULATION THERAPY: CPT | Mod: PN | Performed by: PHYSICAL THERAPIST

## 2020-01-06 PROCEDURE — 97035 APP MDLTY 1+ULTRASOUND EA 15: CPT | Mod: PN | Performed by: PHYSICAL THERAPIST

## 2020-01-06 PROCEDURE — 97110 THERAPEUTIC EXERCISES: CPT | Mod: PN | Performed by: PHYSICAL THERAPIST

## 2020-01-06 NOTE — PLAN OF CARE
Physical Therapy Progress Note     Name: Tali Brice  Clinic Number: [id number]    Therapy Diagnosis:   Encounter Diagnoses   Name Primary?    Chronic bilateral low back pain without sciatica     Chronic bilateral thoracic back pain     Muscle weakness      Physician: Ozzy Cuenca MD    Visit Date: 1/6/2020    Physician Orders: PT Eval and Treat   Medical Diagnosis from Referral:  M79.18 (ICD-10-CM) - Myofascial pain    M54.6 (ICD-10-CM) - Midline thoracic back pain, unspecified chronicity  Evaluation Date: 11/27/2019  Authorization Period Expiration: 12-31-20  Plan of Care Expiration: 2-17-20  Visit # / Visits authorized: 10/ 20  MD Follow up appointment: 1-7-20       Time In: 10:05  Time Out: 11:10  Total Billable Time: 53  minutes    Precautions: Standard    Subjective     Pt reports:.  Lower back is under control.  Pt states upper back has been bothering her lately.  Pt states she took Romy ornaments off tree that led to increased pain on Thurs Jan 2 or 3 .  Upper back has been hurting since then.  The brace does not allow to function when in brace cuts in arm.  Pt realizes she needs to get posture bra    She was compliant with home exercise program.  Response to previous treatment: later that day upper back pain that appeared to be subscap spasm  Functional change: slept fine and moved around ok yesterday and so far today    Pain: 8/10 by the end of activity 9/10 after treatment 0/10, feels looser  Location:bilateral low back and upper back  L will hurt more than R and upper back pain is mid to lower thoracic region     Objective       Muscle Strength 1-6-20  MMT R L   Elbow flexion 5/5 5/5   Elbow extension 5/5 5/5   Shoulder flexion 5-/5 5-/5   Shoulder abduction 5-/5 5-/5   Shoulder external rotation 5/5 5/5   Shoulder internal rotation 5/5 5/5        Upper trap 5/5 5/5   Middle trap 4-/5 4-/5   Lower trap 4-/5 4-/5   Rhomboids 4-/5 4-/5         Muscle Strength 12-23-19  MMT R L   Elbow  flexion 5/5 5/5   Elbow extension 5/5 5/5   Shoulder flexion 5-/5 5-/5   Shoulder abduction 5-/5 5/5   Shoulder external rotation 5/5 5/5   Shoulder internal rotation 5/5 5/5        Upper trap 5/5 5/5   Middle trap 4-/5 4-/5   Lower trap 4/5 4/5   Rhomboids 4-/5 4-/5         Muscle Strength 12-2-19  MMT R L   Elbow flexion 5/5 5/5   Elbow extension 5/5 5/5   Shoulder flexion 4+/5 4+/5   Shoulder abduction 4+/5 4+/5   Shoulder external rotation 4+/5 4+/5   Shoulder internal rotation 5/5 5/5           Upper trap 5/5 5/5   Middle trap 3+/5 3+/5   Lower trap 4-/5 4-/5   Rhomboids 3+/5 3+/5     Muscle Strength 1-6-20  MMT R L   Hip flexion 4+/5 5-/5   Hip abduction 5/5 5/5   Hip extension 5/5 5/5   Glut max 5-/5 4/5       Muscle Strength 12-18-19  MMT R L   Hip flexion 4+/5 4+/5   Hip abduction 5/5 4+/5   Hip extension 5/5 5-/5   Glut max 5/5 4/5       Muscle Strength initial eval  MMT R L   Hip flexion 4/5 4/5   Hip abduction 4+/5 4/5   Hip extension 5/5 4/5   Glut max 5/5 4-/5           Knee extension 5/5 5/5   Knee flexion 5/5 5/5   Ankle dorsiflexion 5/5 5/5   Ankle plantar flexion 5/5 5/5     Lumbar active range of motion in standing is:1-6-20  - flexion - to toes                      - extension -  100%                         - left side bending -  To knee         - right side bending -  To knee          Lumbar active range of motion in standing is: initial eval  - flexion - mid calf                     - extension -  100% some pain in back                         - left side bending -  To knee feels tight         - right side bending -  To knee feels tight           Pelvic positioning: level pelvis and I with self mobat IE AR R     Flexibility testing:  - hamstrings:     90/90 test R 10 L 10           - gastrocnemius:   DF ankle R 10 degrees L 10 degrees      Palpation: mod tenderness rhomboids and thoracic paraspinals no R QL tightness or tenderness at IE severe TTP R QL, no significant  piriformis/gluts     Joint mobility: lumbar WNL         CMS Impairment/Limitation/Restriction for FOTO lumbar Survey     Therapist reviewed FOTO scores for Tali Brice   FOTO documents entered into EPIC - see Media section.     Limitation Score: 48% at IE 50%            TREATMENT    Tali received therapeutic exercises to develop strength, endurance and core stabilization for 15 minutes including:    Instructed pt to hold therex for today and resume tomorrow if feeling better and hold work out.  Instructed pt to try and do arm and leg lift prone together and review proper performance.  SLR x 20, L gets tired quicker  Hip abd sidelying x 20  Arm lift prone x 17   Single side T lift x 17  Hip extension prone with pillow x 20  Hip ext prone with pillow with  bent knee x 20  QL stretch sitting x 1 30 sec  Push/pull x 3  After stretching developed SI dysfunction and did not correct with push/pull, worked with pt further on contract relax and able to self mobilize and noted + change in symptoms    Pulldown with retraction with YTB x 17  Retraction with YTB x 10  Fatigue at end            Tali received the following manual therapy techniques: Soft tissue Mobilization  were applied to the: for 15 minutes, including: STM to B thoracic paraspinals  Instructed pt in need to consider posture bra  Discussed bracing she was using.  Pt requested no taping due to skin irritation     Patient received pre-mod electrical stimulation to decrease muscle tightness and pain to R and L T3-7 region for 15 minutes with MH to neck and upper backwith cycle time: continuous, beat frequency: , CC/CV: CV.        Ultrasound  for pain control and to decrease inflammation @ continuous duty cycle, 1 Mhz, applied to L T4-7 paraspinals and rhomboids , intensity = 1.5 w/cm2 for 8 minutes.    Pt instructed to heat or ice as needed    Home Exercises Provided and Patient Education Provided     Education provided:   - HEP, contract relax  piriformis      Written Home Exercises Provided: no    Exercises were reviewed and Tali was able to demonstrate them prior to the end of the session.  Tali demonstrated good  understanding of the education provided.     See EMR under Patient Instructions for exercises provided at initial eval and  11/29/2019, 12-2-19. 12-9-19 contract relax piriformis, 12-11-19    Assessment   Pt has maintained strength from previous visit, but still gets upper back pain with prolonged upper body activity.  Pt will benefit from continued treatment to assist pt in fully achieving her goals    Tali is progressing well towards her goals.   Pt prognosis is Good.     Pt will continue to benefit from skilled outpatient physical therapy to address the deficits listed in the problem list box on initial evaluation, provide pt/family education and to maximize pt's level of independence in the home and community environment.     Pt's spiritual, cultural and educational needs considered and pt agreeable to plan of care and goals.     Anticipated barriers to physical therapy: none    GOALS:   Short Term Goals:  3 weeks MET STG's  Increase strength 1/2 muscle grade  Improve postural awareness of pelvis to independently identify dysfunction with min assist from PT  Be able to perform HEP with minimal cueing required        Long Term Goals: 6 weeks progressing, not met  Improve muscle strength 1 muscle grade  Improve muscle strength with MMT to 4+/5 to 5/5  Improve and stabilize proper pelvic positioning  Restore ability to work as  without increased pain  Restore normal sleep habits without disturbances due to pain  Restore ability to perform ADL's and household activities independently and without increased pain    Plan   If you concur, I recommend patient continue with physical therapy 2 times a week  for 4-6 weeks.  Please advise us of your  recommendations. Thank you for allowing us to assist in the care of your patient.       Manisha Desouza, MS, PT          I certify the need for these services furnished under this plan of treatment and while under my care.    ____________________________________ Physician/Referring Practitioner                                Date of Signature           Manisha Desouza, PT

## 2020-01-07 ENCOUNTER — OFFICE VISIT (OUTPATIENT)
Dept: PAIN MEDICINE | Facility: CLINIC | Age: 35
End: 2020-01-07
Payer: COMMERCIAL

## 2020-01-07 VITALS
WEIGHT: 126.88 LBS | DIASTOLIC BLOOD PRESSURE: 72 MMHG | HEART RATE: 61 BPM | TEMPERATURE: 98 F | BODY MASS INDEX: 19.29 KG/M2 | SYSTOLIC BLOOD PRESSURE: 111 MMHG | RESPIRATION RATE: 18 BRPM

## 2020-01-07 DIAGNOSIS — M79.18 MYOFASCIAL MUSCLE PAIN: Primary | ICD-10-CM

## 2020-01-07 DIAGNOSIS — M54.6 CHRONIC BILATERAL THORACIC BACK PAIN: ICD-10-CM

## 2020-01-07 DIAGNOSIS — G89.29 CHRONIC BILATERAL THORACIC BACK PAIN: ICD-10-CM

## 2020-01-07 PROCEDURE — 99999 PR PBB SHADOW E&M-EST. PATIENT-LVL III: ICD-10-PCS | Mod: PBBFAC,,, | Performed by: ANESTHESIOLOGY

## 2020-01-07 PROCEDURE — 99214 PR OFFICE/OUTPT VISIT, EST, LEVL IV, 30-39 MIN: ICD-10-PCS | Mod: S$GLB,,, | Performed by: ANESTHESIOLOGY

## 2020-01-07 PROCEDURE — 99214 OFFICE O/P EST MOD 30 MIN: CPT | Mod: S$GLB,,, | Performed by: ANESTHESIOLOGY

## 2020-01-07 PROCEDURE — 99999 PR PBB SHADOW E&M-EST. PATIENT-LVL III: CPT | Mod: PBBFAC,,, | Performed by: ANESTHESIOLOGY

## 2020-01-07 NOTE — PROGRESS NOTES
This note was completed with dictation software and grammatical errors may exist.    CC: Back pain    HPI:  The patient is a 34-year-old woman with a history of anxiety, presents in referral from Dr. Weaver for back pain.  The patient reports falling on some concrete steps in October 2018 after which she complained of mid and low back pain. She returns in follow-up today for midback and low back pain.  Since her last visit, we had obtained cervical spine MRI and also a new thoracic spine MRI with and without contrast that demonstrates that there is no syrinx, only prominent central canal.  I explained that her pain is myofascial and we have set her up with physical therapy and she has been attending for the last 6 weeks.  She reports actually very good relief in her low back to the point where this bothers her only slightly.  She still continues to have thoracic back pain but even that seems to be improving.  She has had some setbacks however, when she does any overhead lifting, getting or limits off of a tree, she has increased pain for several days afterwards.  Nonetheless, she feels that things are improving, she is back to exercising, doing yoga, doing Pilates without increased pain.    Previous history:  She reports having 2 separate areas of pain. The 1st is between her shoulder blades and points to an area from about T10 up to T6.  She states that this area of pain is severe, sharp, throbbing and is usually aggravated by activity, especially after exercising for several days straight.  She states that then it will start hurting for several days afterwards.  It may resolve for several days at a time. She denies any radiation into her chest, no numbness or tingling in her chest. Her other area of pain is in her low back and she points to an area around L3.  States that this is more constant, dull, aching but she can have spasms in that area.  She states that this is more commonly the pain that she has on a  regular basis. She denies any radiation into the legs but does report that it has caused tightness out to the buttocks and hips.  She reports the pain as aching, burning, grabbing, tight, numb, shooting and hot.  It is worse with prolonged sitting or standing, bending or lifting.  She states that nothing helps other than time and it may gradually improve.  She reports having tried NSAIDs, hydrocodone, muscle relaxants without much relief.    She was seen by Neurosurgery recently, Dr. Lebron who reviewed her thoracic spine MRI which shows a dilated cervical and thoracic central canal, unclear if this is just congenital.  This MRI was done without contrast, Dr. Lebron has recommended ache contrast MRI and three-month follow-up.    Pain intervention history:  She reports going to physical therapy at [facility] for about 5 months where she underwent dry needling, massage.  She reports that she has previously exercised on an almost daily basis and now she can only exercise for several days a week and usually has much increased pain afterwards.  She has tried hydrocodone and Zanaflex without much relief.    ROS:  She reports back pain only.  Balance of review of systems is negative.    Past Medical History:   Diagnosis Date    Anxiety     Hx of chlamydia infection        Past Surgical History:   Procedure Laterality Date     SECTION      x2     LASER LAPAROSCOPY      for dysmenorrhea pelvic pain   scope was normal     UPPER GASTROINTESTINAL ENDOSCOPY         Social History     Socioeconomic History    Marital status:      Spouse name: Ludwig    Number of children: 2    Years of education: Not on file    Highest education level: Not on file   Occupational History    Occupation: homemaker   Social Needs    Financial resource strain: Not hard at all    Food insecurity:     Worry: Never true     Inability: Never true    Transportation needs:     Medical: No     Non-medical: No   Tobacco  Use    Smoking status: Former Smoker     Last attempt to quit: 1/1/2010     Years since quitting: 10.0    Smokeless tobacco: Never Used   Substance and Sexual Activity    Alcohol use: Yes     Alcohol/week: 1.0 standard drinks     Types: 1 Glasses of wine per week     Frequency: 4 or more times a week     Drinks per session: 1 or 2     Binge frequency: Never     Comment: daily    Drug use: No    Sexual activity: Yes     Partners: Male     Birth control/protection: None   Lifestyle    Physical activity:     Days per week: 1 day     Minutes per session: 30 min    Stress: Not at all   Relationships    Social connections:     Talks on phone: More than three times a week     Gets together: Once a week     Attends Church service: Not on file     Active member of club or organization: No     Attends meetings of clubs or organizations: Patient refused     Relationship status:    Other Topics Concern    Not on file   Social History Narrative    Not on file         Medications/Allergies: See med card    Vitals:    01/07/20 0902   BP: 111/72   Pulse: 61   Resp: 18   Temp: 98 °F (36.7 °C)   TempSrc: Oral   Weight: 57.6 kg (126 lb 14 oz)   PainSc:   4   PainLoc: Back         Physical exam:  Gen: A and O x3, pleasant, well-groomed  Skin: No rashes or obvious lesions  HEENT: PERRLA, no obvious deformities on ears or in canals. Trachea midline.  CVS: Regular rate and rhythm, normal palpable pulses.  Resp:No increased work of breathing, symmetrical chest rise.  Abdomen: Soft, NT/ND.  Musculoskeletal: Able to heel walk, toe walk. No antalgic gait.  No apparent scoliosis.    Neuro:  Lower extremities: 5/5 strength bilaterally  Reflexes: Patellar 2+, Achilles 2+ bilaterally.  Sensory:  Intact and symmetrical to light touch and pinprick in L2-S1 dermatomes bilaterally.    Lumbar spine:  Lumbar spine:  Range of motion is mildly reduced with flexion and extension with increased pain in the midback only.    Betty  test causes no increased pain on either side.    Supine straight leg raise is negative bilaterally.    Internal and external rotation of the hip causes no increased pain on either side.  Myofascial exam:  Mild tenderness in the lumbar paraspinous musculature.    Imaging:  MRI thoracic spine outside institution 07/19/2019:  Imaging without contrast. There is normal alignment of the thoracic spine.  There is no spondylolisthesis, disc heights all appear normal, signal intensities all appear equal with high-intensity showing adequate hydration.  There is no spondylolisthesis, no significant facet arthropathy.  There does appear to be a persistent central canal within the cord noted to be from the cervical region all the way down to about T12.  There do not seem to be any areas that appear to have greater collections.    10/390/18 Xray L-spine:  Five lumbar type vertebral bodies.  Normal alignment.  No acute fracture.  Vertebral body heights are maintained.  Disc spaces are maintained.  Minimal endplate degenerative changes.  No prevertebral soft tissue abnormality.    Assessment:  The patient is a 34-year-old woman with a history of anxiety, presents in referral from Dr. Weaver for back pain.  1. Myofascial muscle pain     2. Chronic bilateral thoracic back pain         Plan:  1.  We reviewed her thoracic spine MRI, I educated her on relevant anatomy.  Fortunately she is doing much better, I encouraged her to return to physical therapy to continue for the next 4-6 weeks and hopefully add some more Thoracic exercises to continue building that musculature.  She needs to continue these exercises on her own afterwards.  She has gotten back into exercise so I think this will go a long way to improving her pain as well. She is going to follow up with this as needed for now.

## 2020-01-14 ENCOUNTER — PATIENT MESSAGE (OUTPATIENT)
Dept: NEUROSURGERY | Facility: CLINIC | Age: 35
End: 2020-01-14

## 2020-01-15 ENCOUNTER — CLINICAL SUPPORT (OUTPATIENT)
Dept: REHABILITATION | Facility: HOSPITAL | Age: 35
End: 2020-01-15
Payer: COMMERCIAL

## 2020-01-15 DIAGNOSIS — M54.50 CHRONIC BILATERAL LOW BACK PAIN WITHOUT SCIATICA: ICD-10-CM

## 2020-01-15 DIAGNOSIS — M62.81 MUSCLE WEAKNESS: ICD-10-CM

## 2020-01-15 DIAGNOSIS — G89.29 CHRONIC BILATERAL THORACIC BACK PAIN: ICD-10-CM

## 2020-01-15 DIAGNOSIS — G89.29 CHRONIC BILATERAL LOW BACK PAIN WITHOUT SCIATICA: ICD-10-CM

## 2020-01-15 DIAGNOSIS — M54.6 CHRONIC BILATERAL THORACIC BACK PAIN: ICD-10-CM

## 2020-01-15 PROCEDURE — 97110 THERAPEUTIC EXERCISES: CPT | Mod: PN | Performed by: PHYSICAL THERAPIST

## 2020-01-15 PROCEDURE — 97035 APP MDLTY 1+ULTRASOUND EA 15: CPT | Mod: PN | Performed by: PHYSICAL THERAPIST

## 2020-01-15 NOTE — PROGRESS NOTES
Physical Therapy Daily Treament Note     Name: Tali Dubois [facility]  Clinic Number: [id number]    Therapy Diagnosis:   Encounter Diagnoses   Name Primary?    Chronic bilateral low back pain without sciatica     Chronic bilateral thoracic back pain     Muscle weakness      Physician: Ozzy Cuenca MD    Visit Date: 1/15/2020    Physician Orders: PT Eval and Treat   Medical Diagnosis from Referral:  M79.18 (ICD-10-CM) - Myofascial pain    M54.6 (ICD-10-CM) - Midline thoracic back pain, unspecified chronicity  Evaluation Date: 11/27/2019  Authorization Period Expiration: 12-31-20  Plan of Care Expiration: 2-17-20  Visit # / Visits authorized: 11/ 20  MD Follow up appointment: none scheduled       Time In: 9:05  Time Out: 9:55  Total Billable Time: 48  minutes    Precautions: Standard    Subjective     Pt reports:. Got posture bra and had some pain after sitting in a bad chair an did not have posture bra on at time had pain at home and put on bra and felt better.  Presently R rhomboid and paraspinals are having pain.  L side no pain presently but does feel weaker on L.  Pt wearing posture bra today  Low back is doing well so continue with HEP and push/pull  She was compliant with home exercise program.  Response to previous treatment: later that day upper back pain that appeared to be subscap spasm  Functional change: slept fine and moved around ok yesterday and so far today    Pain: 3/10 by the end of activity 9/10 after treatment 0/10, feels looser  Location:bilateral low back and upper back  L will hurt more than R and upper back pain is mid to lower thoracic region     Objective     Good posture noted in posture bra    TREATMENT    Tali received therapeutic exercises to develop strength, endurance and core stabilization for 40 minutes including:    Discussed activities and how posture in sitting in bad chair could lead to increased symptoms.  Instructed pt in increased awareness in those activities that could  lead to increased symptoms and to consider wearing posture bra maybe daily as continue to work on strength    (NP)SLR x 20,   (NP)Hip abd sidelying x 20  Arm and leg lift prone with pillow under stomach x 20   Hip ext prone with pillow with  bent knee x 20  Single side T lift x 20 L    Protraction supine with 5# x 20  QL stretch sitting x 1 30 sec, reviewed mermaid stretch and cat cow and child's pose, rhomboid for additional stretching   Push/pull x 3    Pulldown with retraction with YTB x 10  Retraction with YTB x 10   Fatigue due to increased winging with fatigue from serratus ant ex while performing theraband exercises considered     (NP)Tali received the following manual therapy techniques: Soft tissue Mobilization  were applied to the: for 0  minutes, including: STM to B thoracic paraspinals       Patient received pre-mod electrical stimulation to decrease muscle tightness and pain to R and L T3-7 region for 15 minutes with MH to neck and upper backwith cycle time: continuous, beat frequency: , CC/CV: CV.        Ultrasound  for pain control and to decrease inflammation @ continuous duty cycle, 1 Mhz, applied to R T4-7 paraspinals and rhomboids , intensity = 1.5 w/cm2 for 8 minutes.    Pt instructed to heat or ice as needed    Home Exercises Provided and Patient Education Provided     Education provided:   - HEP, contract relax piriformis      Written Home Exercises Provided: no    Exercises were reviewed and Tali was able to demonstrate them prior to the end of the session.  Tali demonstrated good  understanding of the education provided.     See EMR under Patient Instructions for exercises provided at initial eval and  11/29/2019, 12-2-19. 12-9-19 contract relax piriformis, 12-11-19    Assessment   Pt with good control of LB symptoms with use of push/pull and HEP.  Pt with posture bra and may benefit from daily wear.  Fatigue due to increased winging with fatigue from serratus ant ex while performing  theraband exercises considered  Pt appears to understand need for proper support in sitting.      Tali is progressing well towards her goals.   Pt prognosis is Good.     Pt will continue to benefit from skilled outpatient physical therapy to address the deficits listed in the problem list box on initial evaluation, provide pt/family education and to maximize pt's level of independence in the home and community environment.     Pt's spiritual, cultural and educational needs considered and pt agreeable to plan of care and goals.     Anticipated barriers to physical therapy: none    GOALS:   Short Term Goals:  3 weeks MET STG's  Increase strength 1/2 muscle grade  Improve postural awareness of pelvis to independently identify dysfunction with min assist from PT  Be able to perform HEP with minimal cueing required        Long Term Goals: 6 weeks progressing, not met  Improve muscle strength 1 muscle grade  Improve muscle strength with MMT to 4+/5 to 5/5  Improve and stabilize proper pelvic positioning  Restore ability to work as  without increased pain  Restore normal sleep habits without disturbances due to pain  Restore ability to perform ADL's and household activities independently and without increased pain    Plan   Continue with established plan of care towards PT goals.    Progress strengthening as tolerated Monitor effect of posture bra         Manisha Desouza, PT

## 2020-01-16 ENCOUNTER — PATIENT MESSAGE (OUTPATIENT)
Dept: NEUROSURGERY | Facility: CLINIC | Age: 35
End: 2020-01-16

## 2020-01-16 ENCOUNTER — PATIENT MESSAGE (OUTPATIENT)
Dept: PAIN MEDICINE | Facility: CLINIC | Age: 35
End: 2020-01-16

## 2020-01-16 ENCOUNTER — PATIENT MESSAGE (OUTPATIENT)
Dept: REHABILITATION | Facility: HOSPITAL | Age: 35
End: 2020-01-16

## 2020-01-27 ENCOUNTER — DOCUMENTATION ONLY (OUTPATIENT)
Dept: REHABILITATION | Facility: HOSPITAL | Age: 35
End: 2020-01-27

## 2020-01-27 DIAGNOSIS — M54.6 CHRONIC BILATERAL THORACIC BACK PAIN: ICD-10-CM

## 2020-01-27 DIAGNOSIS — M54.50 CHRONIC BILATERAL LOW BACK PAIN WITHOUT SCIATICA: ICD-10-CM

## 2020-01-27 DIAGNOSIS — G89.29 CHRONIC BILATERAL LOW BACK PAIN WITHOUT SCIATICA: ICD-10-CM

## 2020-01-27 DIAGNOSIS — G89.29 CHRONIC BILATERAL THORACIC BACK PAIN: ICD-10-CM

## 2020-01-27 DIAGNOSIS — M62.81 MUSCLE WEAKNESS: ICD-10-CM

## 2020-01-27 NOTE — PROGRESS NOTES
PHYSICAL THERAPY DISCHARGE SUMMARY    Name: Tali Brice  Referring Provider: Ozzy Cuenca MD  PT Order: PT evaluate and treat   Clinical #: [id number]  Discharge Summary Date: 1/27/2020  Diagnosis:   1. Chronic bilateral low back pain without sciatica     2. Chronic bilateral thoracic back pain     3. Muscle weakness         Patient was seen for 11 OP PT visits from 11-27-19 to 1-15-20. Pt cancelled/no show visit 0 scheduled sessions. Pt cancelled subsequent visits after 1-15-20 that were scheduled due to financial reasons. Treatment included: evaluation, HEP, pt education, joint and soft tissue mobilizations, ther ex, and modalities with electrical stimulation and US. PT unable to fully assess goal achievement as pt did not return for follow up sessions/did not reschedule follow up visits. This patient is discharged from OP PT Services.

## 2020-02-01 ENCOUNTER — PATIENT MESSAGE (OUTPATIENT)
Dept: PAIN MEDICINE | Facility: CLINIC | Age: 35
End: 2020-02-01

## 2020-02-03 RX ORDER — GABAPENTIN 300 MG/1
300 CAPSULE ORAL NIGHTLY
Qty: 30 CAPSULE | Refills: 2 | Status: SHIPPED | OUTPATIENT
Start: 2020-02-03 | End: 2020-03-06 | Stop reason: SDUPTHER

## 2020-02-04 NOTE — TELEPHONE ENCOUNTER
Please schedule her for a follow-up in about 3-4 weeks but also tell her to contact me after 2 weeks to let me know how the gabapentin is going

## 2020-02-17 ENCOUNTER — OFFICE VISIT (OUTPATIENT)
Dept: OBSTETRICS AND GYNECOLOGY | Facility: CLINIC | Age: 35
End: 2020-02-17
Payer: COMMERCIAL

## 2020-02-17 VITALS
WEIGHT: 126.13 LBS | SYSTOLIC BLOOD PRESSURE: 114 MMHG | DIASTOLIC BLOOD PRESSURE: 78 MMHG | BODY MASS INDEX: 19.12 KG/M2 | HEIGHT: 68 IN

## 2020-02-17 DIAGNOSIS — Z01.419 ENCOUNTER FOR GYNECOLOGICAL EXAMINATION (GENERAL) (ROUTINE) WITHOUT ABNORMAL FINDINGS: Primary | ICD-10-CM

## 2020-02-17 PROCEDURE — 99395 PR PREVENTIVE VISIT,EST,18-39: ICD-10-PCS | Mod: S$GLB,,, | Performed by: OBSTETRICS & GYNECOLOGY

## 2020-02-17 PROCEDURE — 99395 PREV VISIT EST AGE 18-39: CPT | Mod: S$GLB,,, | Performed by: OBSTETRICS & GYNECOLOGY

## 2020-02-17 PROCEDURE — 99999 PR PBB SHADOW E&M-EST. PATIENT-LVL III: CPT | Mod: PBBFAC,,, | Performed by: OBSTETRICS & GYNECOLOGY

## 2020-02-17 PROCEDURE — 99999 PR PBB SHADOW E&M-EST. PATIENT-LVL III: ICD-10-PCS | Mod: PBBFAC,,, | Performed by: OBSTETRICS & GYNECOLOGY

## 2020-02-17 RX ORDER — DROSPIRENONE AND ETHINYL ESTRADIOL 0.02-3(28)
1 KIT ORAL DAILY
Qty: 112 TABLET | Refills: 3 | Status: SHIPPED | OUTPATIENT
Start: 2020-02-17 | End: 2021-03-08

## 2020-02-17 NOTE — PROGRESS NOTES
Subjective:       Patient ID: Tali Brice is a 34 y.o. female.    Chief Complaint:  Annual Exam (last pap/hpv 2019 normal )      Patient Active Problem List   Diagnosis    Anxiety       History of Present Illness  34 y.o. yo No obstetric history on file. here for annual exam. No gyn complaints. Doing well. Pain is much better since switching to lactaid and possibly from birth control. Wants to do continuous, has occasional BTB. We discussed again. Also pain from fall is better since taking gabapentin.     Patient had a normal pap smear and HPV in 2019 at last annual visit. I explained new guidelines. Will repeat pap and HPV every 3 years. Answered all questions. Patient agrees.     Past Medical History:   Diagnosis Date    Anxiety     Hx of chlamydia infection        Past Surgical History:   Procedure Laterality Date     SECTION      x2     LASER LAPAROSCOPY      for dysmenorrhea pelvic pain   scope was normal     UPPER GASTROINTESTINAL ENDOSCOPY         OB History   No data available       Patient's last menstrual period was 2020 (exact date).   Date of Last Pap: 2018    Review of Systems  Review of Systems   Constitutional: Negative for fatigue and unexpected weight change.   Respiratory: Negative for shortness of breath.    Cardiovascular: Negative for chest pain.   Gastrointestinal: Negative for abdominal pain, constipation, diarrhea, nausea and vomiting.   Genitourinary: Negative for dysuria.   Musculoskeletal: Negative for back pain.   Skin: Negative for rash.   Neurological: Negative for headaches.   Hematological: Does not bruise/bleed easily.   Psychiatric/Behavioral: Negative for behavioral problems.        Objective:   Physical Exam:   Constitutional: She is oriented to person, place, and time. Vital signs are normal. She appears well-developed and well-nourished. No distress.        Pulmonary/Chest: She exhibits no mass. Right breast exhibits no mass, no nipple  discharge, no skin change, no tenderness, no bleeding and no swelling. Left breast exhibits no mass, no nipple discharge, no skin change, no tenderness, no bleeding and no swelling. Breasts are symmetrical.        Abdominal: Soft. Normal appearance and bowel sounds are normal. She exhibits no distension and no mass. There is no tenderness. There is no rebound.     Genitourinary: Vagina normal and uterus normal. There is no rash, tenderness, lesion or injury on the right labia. There is no rash, tenderness, lesion or injury on the left labia. Uterus is not deviated, not enlarged, not fixed, not tender, not hosting fibroids and not experiencing uterine prolapse. Cervix is normal. Right adnexum displays no mass, no tenderness and no fullness. Left adnexum displays no mass, no tenderness and no fullness. No erythema, tenderness, rectocele, cystocele or unspecified prolapse of vaginal walls in the vagina. No vaginal discharge found. Cervix exhibits no motion tenderness, no discharge and no friability.           Musculoskeletal: Normal range of motion and moves all extremeties.      Lymphadenopathy:     She has no axillary adenopathy.        Right: No supraclavicular adenopathy present.        Left: No supraclavicular adenopathy present.    Neurological: She is alert and oriented to person, place, and time.    Skin: Skin is warm and dry.    Psychiatric: She has a normal mood and affect. Her behavior is normal. Judgment normal.        Assessment/ Plan:     1. Encounter for gynecological examination (general) (routine) without abnormal findings         Follow-up with me in 1 year

## 2020-03-06 ENCOUNTER — OFFICE VISIT (OUTPATIENT)
Dept: PAIN MEDICINE | Facility: CLINIC | Age: 35
End: 2020-03-06
Payer: COMMERCIAL

## 2020-03-06 VITALS
HEART RATE: 67 BPM | RESPIRATION RATE: 18 BRPM | TEMPERATURE: 98 F | SYSTOLIC BLOOD PRESSURE: 104 MMHG | DIASTOLIC BLOOD PRESSURE: 69 MMHG | BODY MASS INDEX: 20.16 KG/M2 | WEIGHT: 132.63 LBS

## 2020-03-06 DIAGNOSIS — M47.816 LUMBAR SPONDYLOSIS: ICD-10-CM

## 2020-03-06 DIAGNOSIS — M79.18 MYOFASCIAL MUSCLE PAIN: ICD-10-CM

## 2020-03-06 DIAGNOSIS — G89.29 CHRONIC BILATERAL THORACIC BACK PAIN: Primary | ICD-10-CM

## 2020-03-06 DIAGNOSIS — M54.6 CHRONIC BILATERAL THORACIC BACK PAIN: Primary | ICD-10-CM

## 2020-03-06 PROCEDURE — 99999 PR PBB SHADOW E&M-EST. PATIENT-LVL III: CPT | Mod: PBBFAC,,, | Performed by: ANESTHESIOLOGY

## 2020-03-06 PROCEDURE — 99213 PR OFFICE/OUTPT VISIT, EST, LEVL III, 20-29 MIN: ICD-10-PCS | Mod: S$GLB,,, | Performed by: ANESTHESIOLOGY

## 2020-03-06 PROCEDURE — 99999 PR PBB SHADOW E&M-EST. PATIENT-LVL III: ICD-10-PCS | Mod: PBBFAC,,, | Performed by: ANESTHESIOLOGY

## 2020-03-06 PROCEDURE — 99213 OFFICE O/P EST LOW 20 MIN: CPT | Mod: S$GLB,,, | Performed by: ANESTHESIOLOGY

## 2020-03-06 RX ORDER — GABAPENTIN 300 MG/1
300 CAPSULE ORAL NIGHTLY
Qty: 30 CAPSULE | Refills: 6 | Status: SHIPPED | OUTPATIENT
Start: 2020-03-06 | End: 2020-04-22 | Stop reason: SDUPTHER

## 2020-03-06 NOTE — PROGRESS NOTES
This note was completed with dictation software and grammatical errors may exist.    CC: Back pain    HPI:  The patient is a 34-year-old woman with a history of anxiety, presents in referral from Dr. Weaver for back pain.  She returns in follow-up for thoracic back pain.  Since I had last seen her, she had been doing physical therapy but discontinue because it was not helping much more.  She had tried to get back into exercises without much relief.  Had her start gabapentin 300 milligrams nightly and she reports greater than 90% relief in her upper back.  She states that this is the best that she has felt in 1 year.  She has been able to start returning to all exercise and denies any worsening of the pain.  She does report that several days ago she did exercises such as rowing and heavier workouts where she did experience some slight increased midback pain but nothing severe.  She is very pleased with how things have improved, denies any side effects with the gabapentin, if she takes it too late has slight drowsiness in the morning.      Previous history:  She reports having 2 separate areas of pain. The 1st is between her shoulder blades and points to an area from about T10 up to T6.  She states that this area of pain is severe, sharp, throbbing and is usually aggravated by activity, especially after exercising for several days straight.  She states that then it will start hurting for several days afterwards.  It may resolve for several days at a time. She denies any radiation into her chest, no numbness or tingling in her chest. Her other area of pain is in her low back and she points to an area around L3.  States that this is more constant, dull, aching but she can have spasms in that area.  She states that this is more commonly the pain that she has on a regular basis. She denies any radiation into the legs but does report that it has caused tightness out to the buttocks and hips.  She reports the pain as  aching, burning, grabbing, tight, numb, shooting and hot.  It is worse with prolonged sitting or standing, bending or lifting.  She states that nothing helps other than time and it may gradually improve.  She reports having tried NSAIDs, hydrocodone, muscle relaxants without much relief.    She was seen by Neurosurgery recently, Dr. Lebron who reviewed her thoracic spine MRI which shows a dilated cervical and thoracic central canal, unclear if this is just congenital.  This MRI was done without contrast, Dr. Lebron has recommended ache contrast MRI and three-month follow-up.    Pain intervention history:  She reports going to physical therapy at [facility] for about 5 months where she underwent dry needling, massage.  She reports that she has previously exercised on an almost daily basis and now she can only exercise for several days a week and usually has much increased pain afterwards.  She has tried hydrocodone and Zanaflex without much relief.    ROS:  She reports back pain only.  Balance of review of systems is negative.    Past Medical History:   Diagnosis Date    Anxiety     Hx of chlamydia infection        Past Surgical History:   Procedure Laterality Date     SECTION      x2     LASER LAPAROSCOPY      for dysmenorrhea pelvic pain   scope was normal     UPPER GASTROINTESTINAL ENDOSCOPY         Social History     Socioeconomic History    Marital status:      Spouse name: Ludwig    Number of children: 2    Years of education: Not on file    Highest education level: Not on file   Occupational History    Occupation: homemaker   Social Needs    Financial resource strain: Not hard at all    Food insecurity:     Worry: Never true     Inability: Never true    Transportation needs:     Medical: No     Non-medical: No   Tobacco Use    Smoking status: Former Smoker     Last attempt to quit: 2010     Years since quitting: 10.1    Smokeless tobacco: Never Used   Substance and  Sexual Activity    Alcohol use: Yes     Alcohol/week: 1.0 standard drinks     Types: 1 Glasses of wine per week     Frequency: 4 or more times a week     Drinks per session: 1 or 2     Binge frequency: Never     Comment: daily    Drug use: No    Sexual activity: Yes     Partners: Male     Birth control/protection: None   Lifestyle    Physical activity:     Days per week: 1 day     Minutes per session: 30 min    Stress: Not at all   Relationships    Social connections:     Talks on phone: More than three times a week     Gets together: Once a week     Attends [employer] service: Not on file     Active member of club or organization: No     Attends meetings of clubs or organizations: Patient refused     Relationship status:    Other Topics Concern    Not on file   Social History Narrative    Not on file         Medications/Allergies: See med card    Vitals:    03/06/20 1035   BP: 104/69   Pulse: 67   Resp: 18   Temp: 98.2 °F (36.8 °C)   Weight: 60.2 kg (132 lb 9.7 oz)   PainSc:   6   PainLoc: Back         Physical exam:  Gen: A and O x3, pleasant, well-groomed  Skin: No rashes or obvious lesions  HEENT: PERRLA, no obvious deformities on ears or in canals. Trachea midline.  CVS: Regular rate and rhythm, normal palpable pulses.  Resp:No increased work of breathing, symmetrical chest rise.  Abdomen: Soft, NT/ND.  Musculoskeletal: Able to heel walk, toe walk. No antalgic gait.  No apparent scoliosis.    Neuro:  Lower extremities: 5/5 strength bilaterally  Reflexes: Patellar 2+, Achilles 2+ bilaterally.  Sensory:  Intact and symmetrical to light touch and pinprick in L2-S1 dermatomes bilaterally.    Lumbar spine:  Lumbar spine:  Range of motion is moderately reduced with forward flexion with increased pain in the right low back, moderately reduced with extension and especially right oblique extension causing right lumbosacral junction right low back pain    Twan's test causes no increased pain on either  side.    Supine straight leg raise is negative bilaterally.    Internal and external rotation of the hip causes no increased pain on either side.  Myofascial exam:  Mild tenderness in the lumbar paraspinous musculature.  No major tenderness over the thoracic paraspinous musculature.    Imaging:  MRI thoracic spine outside institution 07/19/2019:  Imaging without contrast. There is normal alignment of the thoracic spine.  There is no spondylolisthesis, disc heights all appear normal, signal intensities all appear equal with high-intensity showing adequate hydration.  There is no spondylolisthesis, no significant facet arthropathy.  There does appear to be a persistent central canal within the cord noted to be from the cervical region all the way down to about T12.  There do not seem to be any areas that appear to have greater collections.    10/390/18 Xray L-spine:  Five lumbar type vertebral bodies.  Normal alignment.  No acute fracture.  Vertebral body heights are maintained.  Disc spaces are maintained.  Minimal endplate degenerative changes.  No prevertebral soft tissue abnormality.    Assessment:  The patient is a 34-year-old woman with a history of anxiety, presents in referral from Dr. Weaver for back pain.  1. Chronic bilateral thoracic back pain     2. Lumbar spondylosis     3. Myofascial muscle pain         Plan:  1.  Fortunately she has had excellent relief with the midback pain since starting gabapentin.  She will continue 300 mg nightly and we discussed changing this as needed.  2.  In terms of her right low back pain.  I explained that this could be muscular but she also does have some mild facet arthropathy.  We discussed hamstring, hip flexor stretching exercises.  I recommend that she try continue to exercises, possibly try acupuncture.  If this is not helping we could always try a right L3, 4, 5 medial branch block.

## 2020-04-22 RX ORDER — GABAPENTIN 300 MG/1
300 CAPSULE ORAL NIGHTLY
Qty: 30 CAPSULE | Refills: 6 | Status: SHIPPED | OUTPATIENT
Start: 2020-04-22 | End: 2021-01-25

## 2020-07-22 ENCOUNTER — PATIENT MESSAGE (OUTPATIENT)
Dept: OBSTETRICS AND GYNECOLOGY | Facility: CLINIC | Age: 35
End: 2020-07-22

## 2020-07-23 RX ORDER — FLUCONAZOLE 150 MG/1
150 TABLET ORAL DAILY
Qty: 2 TABLET | Refills: 0 | Status: SHIPPED | OUTPATIENT
Start: 2020-07-23 | End: 2020-07-25

## 2020-09-30 ENCOUNTER — PATIENT MESSAGE (OUTPATIENT)
Dept: OBSTETRICS AND GYNECOLOGY | Facility: CLINIC | Age: 35
End: 2020-09-30

## 2021-03-08 ENCOUNTER — OFFICE VISIT (OUTPATIENT)
Dept: OBSTETRICS AND GYNECOLOGY | Facility: CLINIC | Age: 36
End: 2021-03-08
Attending: OBSTETRICS & GYNECOLOGY
Payer: COMMERCIAL

## 2021-03-08 VITALS
HEIGHT: 68 IN | WEIGHT: 132.06 LBS | DIASTOLIC BLOOD PRESSURE: 80 MMHG | BODY MASS INDEX: 20.01 KG/M2 | SYSTOLIC BLOOD PRESSURE: 118 MMHG

## 2021-03-08 DIAGNOSIS — Z12.4 ENCOUNTER FOR PAPANICOLAOU SMEAR FOR CERVICAL CANCER SCREENING: Primary | ICD-10-CM

## 2021-03-08 DIAGNOSIS — Z01.419 ENCOUNTER FOR GYNECOLOGICAL EXAMINATION (GENERAL) (ROUTINE) WITHOUT ABNORMAL FINDINGS: ICD-10-CM

## 2021-03-08 DIAGNOSIS — Z11.51 ENCOUNTER FOR SCREENING FOR HUMAN PAPILLOMAVIRUS (HPV): ICD-10-CM

## 2021-03-08 DIAGNOSIS — Z12.31 ENCOUNTER FOR MAMMOGRAM TO ESTABLISH BASELINE MAMMOGRAM: ICD-10-CM

## 2021-03-08 PROCEDURE — 88175 CYTOPATH C/V AUTO FLUID REDO: CPT | Performed by: OBSTETRICS & GYNECOLOGY

## 2021-03-08 PROCEDURE — 99999 PR PBB SHADOW E&M-EST. PATIENT-LVL III: CPT | Mod: PBBFAC,,, | Performed by: OBSTETRICS & GYNECOLOGY

## 2021-03-08 PROCEDURE — 99999 PR PBB SHADOW E&M-EST. PATIENT-LVL III: ICD-10-PCS | Mod: PBBFAC,,, | Performed by: OBSTETRICS & GYNECOLOGY

## 2021-03-08 PROCEDURE — 99395 PR PREVENTIVE VISIT,EST,18-39: ICD-10-PCS | Mod: S$GLB,,, | Performed by: OBSTETRICS & GYNECOLOGY

## 2021-03-08 PROCEDURE — 99395 PREV VISIT EST AGE 18-39: CPT | Mod: S$GLB,,, | Performed by: OBSTETRICS & GYNECOLOGY

## 2021-03-08 PROCEDURE — 87624 HPV HI-RISK TYP POOLED RSLT: CPT | Performed by: OBSTETRICS & GYNECOLOGY

## 2021-03-08 PROCEDURE — 87625 HPV TYPES 16 & 18 ONLY: CPT | Performed by: OBSTETRICS & GYNECOLOGY

## 2021-03-08 RX ORDER — ASCORBIC ACID 500 MG
500 TABLET ORAL DAILY
COMMUNITY

## 2021-03-11 ENCOUNTER — PATIENT MESSAGE (OUTPATIENT)
Dept: OBSTETRICS AND GYNECOLOGY | Facility: CLINIC | Age: 36
End: 2021-03-11

## 2021-03-16 LAB
CLINICAL INFO: ABNORMAL
CYTO CVX: ABNORMAL
CYTOLOGIST CVX/VAG CYTO: ABNORMAL
CYTOLOGY CMNT CVX/VAG CYTO-IMP: ABNORMAL
CYTOLOGY PAP THIN PREP EXPLANATION: ABNORMAL
DATE OF PREVIOUS PAP: ABNORMAL
DATE PREVIOUS BX: NO
GEN CATEG CVX/VAG CYTO-IMP: ABNORMAL
HPV I/H RISK 4 DNA CVX QL NAA+PROBE: DETECTED
LMP START DATE: ABNORMAL
PATHOLOGIST CVX/VAG CYTO: ABNORMAL
SPECIMEN SOURCE CVX/VAG CYTO: ABNORMAL
STAT OF ADQ CVX/VAG CYTO-IMP: ABNORMAL

## 2021-03-17 LAB
HPV16 DNA CVX QL PROBE+SIG AMP: NOT DETECTED
HPV18 DNA CVX QL PROBE+SIG AMP: NOT DETECTED

## 2021-03-18 ENCOUNTER — PATIENT MESSAGE (OUTPATIENT)
Dept: OBSTETRICS AND GYNECOLOGY | Facility: CLINIC | Age: 36
End: 2021-03-18

## 2021-03-19 ENCOUNTER — TELEPHONE (OUTPATIENT)
Dept: OBSTETRICS AND GYNECOLOGY | Facility: CLINIC | Age: 36
End: 2021-03-19

## 2021-03-24 ENCOUNTER — TELEPHONE (OUTPATIENT)
Dept: OBSTETRICS AND GYNECOLOGY | Facility: CLINIC | Age: 36
End: 2021-03-24

## 2021-03-24 DIAGNOSIS — R92.8 ABNORMAL MAMMOGRAM: Primary | ICD-10-CM

## 2021-03-25 ENCOUNTER — IMMUNIZATION (OUTPATIENT)
Dept: FAMILY MEDICINE | Facility: CLINIC | Age: 36
End: 2021-03-25
Payer: COMMERCIAL

## 2021-03-25 DIAGNOSIS — Z23 NEED FOR VACCINATION: Primary | ICD-10-CM

## 2021-03-25 PROCEDURE — 91300 COVID-19, MRNA, LNP-S, PF, 30 MCG/0.3 ML DOSE VACCINE: ICD-10-PCS | Mod: S$GLB,,, | Performed by: INTERNAL MEDICINE

## 2021-03-25 PROCEDURE — 91300 COVID-19, MRNA, LNP-S, PF, 30 MCG/0.3 ML DOSE VACCINE: CPT | Mod: S$GLB,,, | Performed by: INTERNAL MEDICINE

## 2021-03-25 PROCEDURE — 0001A COVID-19, MRNA, LNP-S, PF, 30 MCG/0.3 ML DOSE VACCINE: ICD-10-PCS | Mod: CV19,S$GLB,, | Performed by: INTERNAL MEDICINE

## 2021-03-25 PROCEDURE — 0001A COVID-19, MRNA, LNP-S, PF, 30 MCG/0.3 ML DOSE VACCINE: CPT | Mod: CV19,S$GLB,, | Performed by: INTERNAL MEDICINE

## 2021-03-26 ENCOUNTER — TELEPHONE (OUTPATIENT)
Dept: OBSTETRICS AND GYNECOLOGY | Facility: CLINIC | Age: 36
End: 2021-03-26
Payer: COMMERCIAL

## 2021-04-15 ENCOUNTER — IMMUNIZATION (OUTPATIENT)
Dept: FAMILY MEDICINE | Facility: CLINIC | Age: 36
End: 2021-04-15
Payer: COMMERCIAL

## 2021-04-15 DIAGNOSIS — Z23 NEED FOR VACCINATION: Primary | ICD-10-CM

## 2021-04-15 PROCEDURE — 0002A COVID-19, MRNA, LNP-S, PF, 30 MCG/0.3 ML DOSE VACCINE: CPT | Mod: PBBFAC | Performed by: FAMILY MEDICINE

## 2021-04-15 PROCEDURE — 91300 COVID-19, MRNA, LNP-S, PF, 30 MCG/0.3 ML DOSE VACCINE: CPT | Mod: PBBFAC | Performed by: FAMILY MEDICINE

## 2021-04-30 ENCOUNTER — PROCEDURE VISIT (OUTPATIENT)
Dept: OBSTETRICS AND GYNECOLOGY | Facility: CLINIC | Age: 36
End: 2021-04-30
Attending: OBSTETRICS & GYNECOLOGY
Payer: COMMERCIAL

## 2021-04-30 VITALS
WEIGHT: 133.06 LBS | BODY MASS INDEX: 20.16 KG/M2 | HEIGHT: 68 IN | DIASTOLIC BLOOD PRESSURE: 62 MMHG | SYSTOLIC BLOOD PRESSURE: 104 MMHG

## 2021-04-30 DIAGNOSIS — R87.618 PAP SMEAR ABNORMALITY OF CERVIX/HUMAN PAPILLOMAVIRUS (HPV) POSITIVE: ICD-10-CM

## 2021-04-30 DIAGNOSIS — Z01.812 PRE-PROCEDURE LAB EXAM: Primary | ICD-10-CM

## 2021-04-30 LAB
B-HCG UR QL: NEGATIVE
CTP QC/QA: YES

## 2021-04-30 PROCEDURE — 88305 TISSUE EXAM BY PATHOLOGIST: CPT | Performed by: PATHOLOGY

## 2021-04-30 PROCEDURE — 81025 URINE PREGNANCY TEST: CPT | Mod: S$GLB,,, | Performed by: OBSTETRICS & GYNECOLOGY

## 2021-04-30 PROCEDURE — 88305 TISSUE EXAM BY PATHOLOGIST: CPT | Mod: 26,,, | Performed by: PATHOLOGY

## 2021-04-30 PROCEDURE — 57454 COLPOSCOPY: ICD-10-PCS | Mod: S$GLB,,, | Performed by: OBSTETRICS & GYNECOLOGY

## 2021-04-30 PROCEDURE — 81025 POCT URINE PREGNANCY: ICD-10-PCS | Mod: S$GLB,,, | Performed by: OBSTETRICS & GYNECOLOGY

## 2021-04-30 PROCEDURE — 57454 BX/CURETT OF CERVIX W/SCOPE: CPT | Mod: S$GLB,,, | Performed by: OBSTETRICS & GYNECOLOGY

## 2021-04-30 PROCEDURE — 88305 TISSUE EXAM BY PATHOLOGIST: ICD-10-PCS | Mod: 26,,, | Performed by: PATHOLOGY

## 2021-05-10 LAB
FINAL PATHOLOGIC DIAGNOSIS: NORMAL
GROSS: NORMAL
Lab: NORMAL

## 2021-05-12 ENCOUNTER — PATIENT MESSAGE (OUTPATIENT)
Dept: OBSTETRICS AND GYNECOLOGY | Facility: CLINIC | Age: 36
End: 2021-05-12

## 2021-06-23 ENCOUNTER — PROCEDURE VISIT (OUTPATIENT)
Dept: OBSTETRICS AND GYNECOLOGY | Facility: CLINIC | Age: 36
End: 2021-06-23
Attending: OBSTETRICS & GYNECOLOGY
Payer: COMMERCIAL

## 2021-06-23 VITALS — HEIGHT: 68 IN | BODY MASS INDEX: 20.43 KG/M2 | WEIGHT: 134.81 LBS

## 2021-06-23 DIAGNOSIS — N87.9 DYSPLASIA OF CERVIX: ICD-10-CM

## 2021-06-23 DIAGNOSIS — N87.1 DYSPLASIA OF CERVIX, HIGH GRADE CIN 2: ICD-10-CM

## 2021-06-23 DIAGNOSIS — Z01.812 PRE-PROCEDURE LAB EXAM: ICD-10-CM

## 2021-06-23 DIAGNOSIS — R87.618 PAP SMEAR ABNORMALITY OF CERVIX/HUMAN PAPILLOMAVIRUS (HPV) POSITIVE: Primary | ICD-10-CM

## 2021-06-23 LAB
B-HCG UR QL: NEGATIVE
CTP QC/QA: YES

## 2021-06-23 PROCEDURE — 57461 PR COLPOSCOPY,CERVIX W/ADJ VAG,W/LOOP CONIZ: ICD-10-PCS | Mod: S$GLB,,, | Performed by: OBSTETRICS & GYNECOLOGY

## 2021-06-23 PROCEDURE — 88305 TISSUE EXAM BY PATHOLOGIST: ICD-10-PCS | Mod: 26,,, | Performed by: PATHOLOGY

## 2021-06-23 PROCEDURE — 88305 TISSUE EXAM BY PATHOLOGIST: CPT | Mod: 26,,, | Performed by: PATHOLOGY

## 2021-06-23 PROCEDURE — 88307 TISSUE EXAM BY PATHOLOGIST: CPT | Performed by: PATHOLOGY

## 2021-06-23 PROCEDURE — 57461 CONZ OF CERVIX W/SCOPE LEEP: CPT | Mod: S$GLB,,, | Performed by: OBSTETRICS & GYNECOLOGY

## 2021-06-23 PROCEDURE — 81025 POCT URINE PREGNANCY: ICD-10-PCS | Mod: S$GLB,,, | Performed by: OBSTETRICS & GYNECOLOGY

## 2021-06-23 PROCEDURE — 81025 URINE PREGNANCY TEST: CPT | Mod: S$GLB,,, | Performed by: OBSTETRICS & GYNECOLOGY

## 2021-06-23 PROCEDURE — 88305 TISSUE EXAM BY PATHOLOGIST: CPT | Performed by: PATHOLOGY

## 2021-06-29 ENCOUNTER — PATIENT MESSAGE (OUTPATIENT)
Dept: OBSTETRICS AND GYNECOLOGY | Facility: CLINIC | Age: 36
End: 2021-06-29

## 2021-06-29 DIAGNOSIS — N92.6 IRREGULAR PERIODS/MENSTRUAL CYCLES: Primary | ICD-10-CM

## 2021-07-01 LAB
FINAL PATHOLOGIC DIAGNOSIS: NORMAL
GROSS: NORMAL
Lab: NORMAL

## 2021-07-02 ENCOUNTER — LAB VISIT (OUTPATIENT)
Dept: LAB | Facility: HOSPITAL | Age: 36
End: 2021-07-02
Attending: OBSTETRICS & GYNECOLOGY
Payer: COMMERCIAL

## 2021-07-02 ENCOUNTER — TELEPHONE (OUTPATIENT)
Dept: OBSTETRICS AND GYNECOLOGY | Facility: CLINIC | Age: 36
End: 2021-07-02

## 2021-07-02 DIAGNOSIS — N92.6 IRREGULAR PERIODS/MENSTRUAL CYCLES: ICD-10-CM

## 2021-07-02 LAB
PROGEST SERPL-MCNC: 13.8 NG/ML
TSH SERPL DL<=0.005 MIU/L-ACNC: 2.94 UIU/ML (ref 0.4–4)

## 2021-07-02 PROCEDURE — 84144 ASSAY OF PROGESTERONE: CPT | Performed by: OBSTETRICS & GYNECOLOGY

## 2021-07-02 PROCEDURE — 36415 COLL VENOUS BLD VENIPUNCTURE: CPT | Mod: PN | Performed by: OBSTETRICS & GYNECOLOGY

## 2021-07-02 PROCEDURE — 84443 ASSAY THYROID STIM HORMONE: CPT | Performed by: OBSTETRICS & GYNECOLOGY

## 2021-10-05 ENCOUNTER — PATIENT MESSAGE (OUTPATIENT)
Dept: OBSTETRICS AND GYNECOLOGY | Facility: CLINIC | Age: 36
End: 2021-10-05

## 2021-11-10 ENCOUNTER — OFFICE VISIT (OUTPATIENT)
Dept: OBSTETRICS AND GYNECOLOGY | Facility: CLINIC | Age: 36
End: 2021-11-10
Attending: OBSTETRICS & GYNECOLOGY
Payer: COMMERCIAL

## 2021-11-10 ENCOUNTER — LAB VISIT (OUTPATIENT)
Dept: LAB | Facility: HOSPITAL | Age: 36
End: 2021-11-10
Attending: OBSTETRICS & GYNECOLOGY
Payer: COMMERCIAL

## 2021-11-10 VITALS
WEIGHT: 130.06 LBS | HEIGHT: 68 IN | DIASTOLIC BLOOD PRESSURE: 74 MMHG | SYSTOLIC BLOOD PRESSURE: 110 MMHG | BODY MASS INDEX: 19.71 KG/M2

## 2021-11-10 DIAGNOSIS — Z78.9 TRYING TO GET PREGNANT: ICD-10-CM

## 2021-11-10 DIAGNOSIS — Z78.9 TRYING TO GET PREGNANT: Primary | ICD-10-CM

## 2021-11-10 PROCEDURE — 36415 COLL VENOUS BLD VENIPUNCTURE: CPT | Mod: PN | Performed by: OBSTETRICS & GYNECOLOGY

## 2021-11-10 PROCEDURE — 99214 OFFICE O/P EST MOD 30 MIN: CPT | Mod: S$GLB,,, | Performed by: OBSTETRICS & GYNECOLOGY

## 2021-11-10 PROCEDURE — 99999 PR PBB SHADOW E&M-EST. PATIENT-LVL III: ICD-10-PCS | Mod: PBBFAC,,, | Performed by: OBSTETRICS & GYNECOLOGY

## 2021-11-10 PROCEDURE — 99214 PR OFFICE/OUTPT VISIT, EST, LEVL IV, 30-39 MIN: ICD-10-PCS | Mod: S$GLB,,, | Performed by: OBSTETRICS & GYNECOLOGY

## 2021-11-10 PROCEDURE — 83520 IMMUNOASSAY QUANT NOS NONAB: CPT | Performed by: OBSTETRICS & GYNECOLOGY

## 2021-11-10 PROCEDURE — 99999 PR PBB SHADOW E&M-EST. PATIENT-LVL III: CPT | Mod: PBBFAC,,, | Performed by: OBSTETRICS & GYNECOLOGY

## 2021-11-10 RX ORDER — CLINDAMYCIN HYDROCHLORIDE 300 MG/1
300 CAPSULE ORAL 3 TIMES DAILY
COMMUNITY
Start: 2021-11-03 | End: 2022-03-11

## 2021-11-12 LAB — MIS SERPL-MCNC: 0.53 NG/ML (ref 0.15–7.5)

## 2021-12-14 ENCOUNTER — IMMUNIZATION (OUTPATIENT)
Dept: FAMILY MEDICINE | Facility: CLINIC | Age: 36
End: 2021-12-14
Payer: COMMERCIAL

## 2021-12-14 DIAGNOSIS — Z23 NEED FOR VACCINATION: Primary | ICD-10-CM

## 2021-12-14 PROCEDURE — 0004A COVID-19, MRNA, LNP-S, PF, 30 MCG/0.3 ML DOSE VACCINE: CPT | Mod: PBBFAC | Performed by: RADIOLOGY

## 2022-03-11 ENCOUNTER — OFFICE VISIT (OUTPATIENT)
Dept: OBSTETRICS AND GYNECOLOGY | Facility: CLINIC | Age: 37
End: 2022-03-11
Attending: OBSTETRICS & GYNECOLOGY
Payer: COMMERCIAL

## 2022-03-11 VITALS
BODY MASS INDEX: 19.48 KG/M2 | WEIGHT: 128.5 LBS | HEIGHT: 68 IN | DIASTOLIC BLOOD PRESSURE: 80 MMHG | SYSTOLIC BLOOD PRESSURE: 110 MMHG

## 2022-03-11 DIAGNOSIS — Z12.4 SCREENING FOR CERVICAL CANCER: ICD-10-CM

## 2022-03-11 DIAGNOSIS — Z01.419 ENCOUNTER FOR GYNECOLOGICAL EXAMINATION (GENERAL) (ROUTINE) WITHOUT ABNORMAL FINDINGS: Primary | ICD-10-CM

## 2022-03-11 DIAGNOSIS — Z11.51 SCREENING FOR HPV (HUMAN PAPILLOMAVIRUS): ICD-10-CM

## 2022-03-11 PROCEDURE — 1159F MED LIST DOCD IN RCRD: CPT | Mod: CPTII,S$GLB,, | Performed by: OBSTETRICS & GYNECOLOGY

## 2022-03-11 PROCEDURE — 99999 PR PBB SHADOW E&M-EST. PATIENT-LVL III: ICD-10-PCS | Mod: PBBFAC,,, | Performed by: OBSTETRICS & GYNECOLOGY

## 2022-03-11 PROCEDURE — 3079F PR MOST RECENT DIASTOLIC BLOOD PRESSURE 80-89 MM HG: ICD-10-PCS | Mod: CPTII,S$GLB,, | Performed by: OBSTETRICS & GYNECOLOGY

## 2022-03-11 PROCEDURE — 88141 CYTOPATH C/V INTERPRET: CPT | Mod: ,,, | Performed by: PATHOLOGY

## 2022-03-11 PROCEDURE — 3008F BODY MASS INDEX DOCD: CPT | Mod: CPTII,S$GLB,, | Performed by: OBSTETRICS & GYNECOLOGY

## 2022-03-11 PROCEDURE — 88175 CYTOPATH C/V AUTO FLUID REDO: CPT | Performed by: PATHOLOGY

## 2022-03-11 PROCEDURE — 99395 PREV VISIT EST AGE 18-39: CPT | Mod: S$GLB,,, | Performed by: OBSTETRICS & GYNECOLOGY

## 2022-03-11 PROCEDURE — 1159F PR MEDICATION LIST DOCUMENTED IN MEDICAL RECORD: ICD-10-PCS | Mod: CPTII,S$GLB,, | Performed by: OBSTETRICS & GYNECOLOGY

## 2022-03-11 PROCEDURE — 3074F PR MOST RECENT SYSTOLIC BLOOD PRESSURE < 130 MM HG: ICD-10-PCS | Mod: CPTII,S$GLB,, | Performed by: OBSTETRICS & GYNECOLOGY

## 2022-03-11 PROCEDURE — 99395 PR PREVENTIVE VISIT,EST,18-39: ICD-10-PCS | Mod: S$GLB,,, | Performed by: OBSTETRICS & GYNECOLOGY

## 2022-03-11 PROCEDURE — 3079F DIAST BP 80-89 MM HG: CPT | Mod: CPTII,S$GLB,, | Performed by: OBSTETRICS & GYNECOLOGY

## 2022-03-11 PROCEDURE — 3074F SYST BP LT 130 MM HG: CPT | Mod: CPTII,S$GLB,, | Performed by: OBSTETRICS & GYNECOLOGY

## 2022-03-11 PROCEDURE — 3008F PR BODY MASS INDEX (BMI) DOCUMENTED: ICD-10-PCS | Mod: CPTII,S$GLB,, | Performed by: OBSTETRICS & GYNECOLOGY

## 2022-03-11 PROCEDURE — 87624 HPV HI-RISK TYP POOLED RSLT: CPT | Performed by: OBSTETRICS & GYNECOLOGY

## 2022-03-11 PROCEDURE — 99999 PR PBB SHADOW E&M-EST. PATIENT-LVL III: CPT | Mod: PBBFAC,,, | Performed by: OBSTETRICS & GYNECOLOGY

## 2022-03-11 PROCEDURE — 88141 PR  CYTOPATH CERV/VAG INTERPRET: ICD-10-PCS | Mod: ,,, | Performed by: PATHOLOGY

## 2022-03-11 NOTE — PROGRESS NOTES
Subjective:       Patient ID: Tali Brice is a 36 y.o. female.    Chief Complaint:  Well Woman (03/08/21-pap/hpv-ASCUS/HPV other +)        History of Present Illness  Tali Brice is a 36 y.o. female No obstetric history on file. who presents for annual. Overall doing well. Off OCP because did not like the way she felt on it. Decided not to pursue HUSSAIN    Last year HPV positive, will repeat again today and if positive then colpo again.     Patient's last menstrual period was 02/25/2022.   Date of Last Pap: No result found    Review of Systems  Review of Systems   Constitutional: Negative for chills and fever.        Objective:   Physical Exam:   Constitutional: She is oriented to person, place, and time. Vital signs are normal. She appears well-developed and well-nourished. No distress.        Pulmonary/Chest: She exhibits no mass. Right breast exhibits mass (well circumscribed, mobile, benign, stable ). Right breast exhibits no nipple discharge, no skin change, no tenderness, no bleeding and no swelling. Left breast exhibits no mass, no nipple discharge, no skin change, no tenderness, no bleeding and no swelling. Breasts are symmetrical.            Abdominal: Soft. Bowel sounds are normal. She exhibits no distension and no mass. There is no abdominal tenderness. There is no rebound.     Genitourinary:    Vagina and uterus normal.   There is no rash, tenderness, lesion or injury on the right labia. There is no rash, tenderness, lesion or injury on the left labia. Cervix is normal. Right adnexum displays no mass, no tenderness and no fullness. Left adnexum displays no mass, no tenderness and no fullness. No erythema,  no vaginal discharge, tenderness, rectocele, cystocele or unspecified prolapse of vaginal walls in the vagina. Cervix exhibits no motion tenderness, no discharge and no friability. Uterus is not deviated, not enlarged, not fixed, not tender and not hosting fibroids.           Musculoskeletal:  Normal range of motion and moves all extremeties.      Lymphadenopathy:        Right: No supraclavicular adenopathy present.        Left: No supraclavicular adenopathy present.    Neurological: She is alert and oriented to person, place, and time.    Skin: Skin is warm and dry.    Psychiatric: She has a normal mood and affect. Her behavior is normal. Judgment normal.        Assessment/ Plan:     1. Encounter for gynecological examination (general) (routine) without abnormal findings         Follow up in about 1 year (around 3/11/2023) for Annual exam.    As of April 1, 2021, the Cures Act has been passed nationally. This new law requires that all doctors progress notes, lab results, pathology reports and radiology reports be released IMMEDIATELY to the patient in the patient portal. That means that the results are released to you at the EXACT same time they are released to me. Therefore, with all of the patients that I have I am not able to reply to each patient exactly when the results come in. So there will be a delay from when you see the results to when I see them and have time to come up with a response to send you. Also I only see these results when I am on the computer at work. So if the results come in over the weekend or after 5 pm of a work day, I will not see them until the next business day. As you can tell, this is a challenge as a physician to give every patient the quick response they hope for and deserve. So please be patient! Thanks for understanding, Dr. Harrington

## 2022-03-17 LAB
HPV HR 12 DNA SPEC QL NAA+PROBE: POSITIVE
HPV16 AG SPEC QL: NEGATIVE
HPV18 DNA SPEC QL NAA+PROBE: NEGATIVE

## 2022-03-18 LAB
FINAL PATHOLOGIC DIAGNOSIS: ABNORMAL
Lab: ABNORMAL

## 2022-03-18 NOTE — PROGRESS NOTES
ASCUS HPV other, had this before but was negative and now positive again so needs colpo. Please schedule

## 2022-05-13 ENCOUNTER — PROCEDURE VISIT (OUTPATIENT)
Dept: OBSTETRICS AND GYNECOLOGY | Facility: CLINIC | Age: 37
End: 2022-05-13
Attending: OBSTETRICS & GYNECOLOGY
Payer: COMMERCIAL

## 2022-05-13 VITALS
WEIGHT: 128.88 LBS | BODY MASS INDEX: 19.53 KG/M2 | DIASTOLIC BLOOD PRESSURE: 68 MMHG | SYSTOLIC BLOOD PRESSURE: 100 MMHG | HEIGHT: 68 IN

## 2022-05-13 DIAGNOSIS — Z01.812 PRE-PROCEDURE LAB EXAM: Primary | ICD-10-CM

## 2022-05-13 DIAGNOSIS — R87.610 ASCUS WITH POSITIVE HIGH RISK HPV CERVICAL: ICD-10-CM

## 2022-05-13 DIAGNOSIS — R87.810 ASCUS WITH POSITIVE HIGH RISK HPV CERVICAL: ICD-10-CM

## 2022-05-13 LAB
B-HCG UR QL: NEGATIVE
CTP QC/QA: YES

## 2022-05-13 PROCEDURE — 88305 TISSUE EXAM BY PATHOLOGIST: ICD-10-PCS | Mod: 26,,, | Performed by: PATHOLOGY

## 2022-05-13 PROCEDURE — 57456 ENDOCERV CURETTAGE W/SCOPE: CPT | Mod: S$GLB,,, | Performed by: OBSTETRICS & GYNECOLOGY

## 2022-05-13 PROCEDURE — 88305 TISSUE EXAM BY PATHOLOGIST: CPT | Mod: 26,,, | Performed by: PATHOLOGY

## 2022-05-13 PROCEDURE — 88305 TISSUE EXAM BY PATHOLOGIST: CPT | Performed by: PATHOLOGY

## 2022-05-13 PROCEDURE — 81025 URINE PREGNANCY TEST: CPT | Mod: S$GLB,,, | Performed by: OBSTETRICS & GYNECOLOGY

## 2022-05-13 PROCEDURE — 57456 COLPOSCOPY: ICD-10-PCS | Mod: S$GLB,,, | Performed by: OBSTETRICS & GYNECOLOGY

## 2022-05-13 PROCEDURE — 81025 POCT URINE PREGNANCY: ICD-10-PCS | Mod: S$GLB,,, | Performed by: OBSTETRICS & GYNECOLOGY

## 2022-05-13 NOTE — PROCEDURES
Colposcopy    Date/Time: 5/13/2022 1:00 PM  Performed by: Ursula Harrington MD  Authorized by: Ursula Harrington MD     Consent Done?:  Yes (Written)  Timeout:Immediately prior to procedure a time out was called to verify the correct patient, procedure, equipment, support staff and site/side marked as required  Assistants?: No      Colposcopy Site:  Cervix  Position:  Supine  Acrowhite Lesion: No    Atypical Vessels: No    Transformation Zone Adequate?: Yes    Biopsy?: No    ECC Performed?: Yes    LEEP Performed?: No    Estimated blood loss (cc):  1   Patient tolerated the procedure well with no immediate complications.   Post-operative instructions were provided for the patient.   Patient was discharged and will follow up if any complications occur     LEEP 2021- JALEEL 2 with negative margins. Pap HPV other  No AWFL

## 2022-05-20 LAB
FINAL PATHOLOGIC DIAGNOSIS: NORMAL
Lab: NORMAL

## 2022-11-04 ENCOUNTER — PATIENT MESSAGE (OUTPATIENT)
Dept: OBSTETRICS AND GYNECOLOGY | Facility: CLINIC | Age: 37
End: 2022-11-04
Payer: COMMERCIAL

## 2022-12-28 RX ORDER — DROSPIRENONE AND ETHINYL ESTRADIOL 0.02-3(28)
KIT ORAL
Qty: 112 TABLET | Refills: 1 | Status: SHIPPED | OUTPATIENT
Start: 2022-12-28 | End: 2023-03-27 | Stop reason: SDUPTHER

## 2023-02-06 ENCOUNTER — PATIENT MESSAGE (OUTPATIENT)
Dept: OBSTETRICS AND GYNECOLOGY | Facility: CLINIC | Age: 38
End: 2023-02-06
Payer: COMMERCIAL

## 2023-02-06 DIAGNOSIS — N76.0 ACUTE VAGINITIS: Primary | ICD-10-CM

## 2023-02-06 RX ORDER — FLUCONAZOLE 150 MG/1
150 TABLET ORAL DAILY
Qty: 2 TABLET | Refills: 0 | Status: SHIPPED | OUTPATIENT
Start: 2023-02-06 | End: 2023-02-08

## 2023-03-27 ENCOUNTER — OFFICE VISIT (OUTPATIENT)
Dept: OBSTETRICS AND GYNECOLOGY | Facility: CLINIC | Age: 38
End: 2023-03-27
Attending: OBSTETRICS & GYNECOLOGY
Payer: COMMERCIAL

## 2023-03-27 VITALS
SYSTOLIC BLOOD PRESSURE: 118 MMHG | HEIGHT: 68 IN | DIASTOLIC BLOOD PRESSURE: 80 MMHG | BODY MASS INDEX: 20.15 KG/M2 | WEIGHT: 132.94 LBS

## 2023-03-27 DIAGNOSIS — Z01.419 ENCOUNTER FOR GYNECOLOGICAL EXAMINATION (GENERAL) (ROUTINE) WITHOUT ABNORMAL FINDINGS: ICD-10-CM

## 2023-03-27 DIAGNOSIS — Z12.4 ENCOUNTER FOR PAPANICOLAOU SMEAR FOR CERVICAL CANCER SCREENING: ICD-10-CM

## 2023-03-27 DIAGNOSIS — Z11.51 ENCOUNTER FOR SCREENING FOR HUMAN PAPILLOMAVIRUS (HPV): Primary | ICD-10-CM

## 2023-03-27 PROCEDURE — 88175 CYTOPATH C/V AUTO FLUID REDO: CPT | Performed by: OBSTETRICS & GYNECOLOGY

## 2023-03-27 PROCEDURE — 3008F BODY MASS INDEX DOCD: CPT | Mod: CPTII,S$GLB,, | Performed by: OBSTETRICS & GYNECOLOGY

## 2023-03-27 PROCEDURE — 99999 PR PBB SHADOW E&M-EST. PATIENT-LVL III: CPT | Mod: PBBFAC,,, | Performed by: OBSTETRICS & GYNECOLOGY

## 2023-03-27 PROCEDURE — 99999 PR PBB SHADOW E&M-EST. PATIENT-LVL III: ICD-10-PCS | Mod: PBBFAC,,, | Performed by: OBSTETRICS & GYNECOLOGY

## 2023-03-27 PROCEDURE — 1159F MED LIST DOCD IN RCRD: CPT | Mod: CPTII,S$GLB,, | Performed by: OBSTETRICS & GYNECOLOGY

## 2023-03-27 PROCEDURE — 3079F PR MOST RECENT DIASTOLIC BLOOD PRESSURE 80-89 MM HG: ICD-10-PCS | Mod: CPTII,S$GLB,, | Performed by: OBSTETRICS & GYNECOLOGY

## 2023-03-27 PROCEDURE — 99395 PREV VISIT EST AGE 18-39: CPT | Mod: S$GLB,,, | Performed by: OBSTETRICS & GYNECOLOGY

## 2023-03-27 PROCEDURE — 3074F SYST BP LT 130 MM HG: CPT | Mod: CPTII,S$GLB,, | Performed by: OBSTETRICS & GYNECOLOGY

## 2023-03-27 PROCEDURE — 3008F PR BODY MASS INDEX (BMI) DOCUMENTED: ICD-10-PCS | Mod: CPTII,S$GLB,, | Performed by: OBSTETRICS & GYNECOLOGY

## 2023-03-27 PROCEDURE — 1159F PR MEDICATION LIST DOCUMENTED IN MEDICAL RECORD: ICD-10-PCS | Mod: CPTII,S$GLB,, | Performed by: OBSTETRICS & GYNECOLOGY

## 2023-03-27 PROCEDURE — 99395 PR PREVENTIVE VISIT,EST,18-39: ICD-10-PCS | Mod: S$GLB,,, | Performed by: OBSTETRICS & GYNECOLOGY

## 2023-03-27 PROCEDURE — 3074F PR MOST RECENT SYSTOLIC BLOOD PRESSURE < 130 MM HG: ICD-10-PCS | Mod: CPTII,S$GLB,, | Performed by: OBSTETRICS & GYNECOLOGY

## 2023-03-27 PROCEDURE — 3079F DIAST BP 80-89 MM HG: CPT | Mod: CPTII,S$GLB,, | Performed by: OBSTETRICS & GYNECOLOGY

## 2023-03-27 PROCEDURE — 87624 HPV HI-RISK TYP POOLED RSLT: CPT | Performed by: OBSTETRICS & GYNECOLOGY

## 2023-03-27 RX ORDER — DROSPIRENONE AND ETHINYL ESTRADIOL 0.02-3(28)
KIT ORAL
Qty: 112 TABLET | Refills: 4 | Status: SHIPPED | OUTPATIENT
Start: 2023-03-27 | End: 2023-11-02

## 2023-03-27 NOTE — PROGRESS NOTES
Subjective:       Patient ID: Tali Brice is a 37 y.o. female.    Chief Complaint:  Annual Exam (Last pap/hpv  abnormal-Ascus,Hpv+other)        History of Present Illness  Tali Brice is a 37 y.o. female  who presents for annual. Overall doing well on OCP. All questions answered. H/o JALEEL 2 on LEEP in  with negative margins. Repeat pap and HPV today. If positive then colpo again. All questions answered    No LMP recorded. (Menstrual status: Birth Control).   Date of Last Pap: 3/27/2023    Review of Systems  Review of Systems   Constitutional:  Negative for chills and fever.      Objective:   Physical Exam:   Constitutional: She is oriented to person, place, and time. Vital signs are normal. She appears well-developed and well-nourished. No distress.        Pulmonary/Chest: She exhibits no mass. Right breast exhibits no mass, no nipple discharge, no skin change, no tenderness, no bleeding and no swelling. Left breast exhibits no mass, no nipple discharge, no skin change, no tenderness, no bleeding and no swelling. Breasts are symmetrical.        Abdominal: Soft. Bowel sounds are normal. She exhibits no distension and no mass. There is no abdominal tenderness. There is no rebound.     Genitourinary:    Vagina and uterus normal.   There is no rash, tenderness, lesion or injury on the right labia. There is no rash, tenderness, lesion or injury on the left labia. Cervix is normal. Right adnexum displays no mass, no tenderness and no fullness. Left adnexum displays no mass, no tenderness and no fullness. No erythema,  no vaginal discharge, tenderness, rectocele, cystocele or unspecified prolapse of vaginal walls in the vagina. Cervix exhibits no motion tenderness, no discharge and no friability. Uterus is not deviated, not enlarged, not fixed, not tender and not hosting fibroids.           Musculoskeletal: Normal range of motion and moves all extremeties.      Lymphadenopathy:        Right: No  supraclavicular adenopathy present.        Left: No supraclavicular adenopathy present.    Neurological: She is alert and oriented to person, place, and time.    Skin: Skin is warm and dry.    Psychiatric: She has a normal mood and affect. Her behavior is normal. Judgment normal.      Assessment/ Plan:     1. Encounter for screening for human papillomavirus (HPV)  HPV High Risk Genotypes, PCR      2. Encounter for Papanicolaou smear for cervical cancer screening  Liquid-Based Pap Smear, Screening      3. Encounter for gynecological examination (general) (routine) without abnormal findings  drospirenone-ethinyl estradioL (LORYNA, 28,) 3-0.02 mg per tablet          No follow-ups on file.    As of April 1, 2021, the Cures Act has been passed nationally. This new law requires that all doctors progress notes, lab results, pathology reports and radiology reports be released IMMEDIATELY to the patient in the patient portal. That means that the results are released to you at the EXACT same time they are released to me. Therefore, with all of the patients that I have I am not able to reply to each patient exactly when the results come in. So there will be a delay from when you see the results to when I see them and have time to come up with a response to send you. Also I only see these results when I am on the computer at work. So if the results come in over the weekend or after 5 pm of a work day, I will not see them until the next business day. As you can tell, this is a challenge as a physician to give every patient the quick response they hope for and deserve. So please be patient! Thanks for understanding, Dr. Harrington

## 2023-04-03 ENCOUNTER — PATIENT MESSAGE (OUTPATIENT)
Dept: OBSTETRICS AND GYNECOLOGY | Facility: CLINIC | Age: 38
End: 2023-04-03
Payer: COMMERCIAL

## 2023-04-03 LAB
CLINICAL INFO: ABNORMAL
CYTO CVX: ABNORMAL
CYTOLOGIST CVX/VAG CYTO: ABNORMAL
CYTOLOGIST CVX/VAG CYTO: ABNORMAL
CYTOLOGY CMNT CVX/VAG CYTO-IMP: ABNORMAL
CYTOLOGY PAP THIN PREP EXPLANATION: ABNORMAL
DATE OF PREVIOUS PAP: ABNORMAL
DATE PREVIOUS BX: NO
GEN CATEG CVX/VAG CYTO-IMP: ABNORMAL
HPV I/H RISK 4 DNA CVX QL NAA+PROBE: NOT DETECTED
LMP START DATE: ABNORMAL
MICROORGANISM CVX/VAG CYTO: ABNORMAL
PATHOLOGIST CVX/VAG CYTO: ABNORMAL
SERVICE CMNT-IMP: ABNORMAL
SPECIMEN SOURCE CVX/VAG CYTO: ABNORMAL
STAT OF ADQ CVX/VAG CYTO-IMP: ABNORMAL

## 2023-07-07 ENCOUNTER — LAB VISIT (OUTPATIENT)
Dept: LAB | Facility: HOSPITAL | Age: 38
End: 2023-07-07
Payer: COMMERCIAL

## 2023-07-07 DIAGNOSIS — Z00.00 WELL ADULT EXAM: ICD-10-CM

## 2023-07-07 LAB
ALBUMIN SERPL BCP-MCNC: 3.4 G/DL (ref 3.5–5.2)
ALP SERPL-CCNC: 23 U/L (ref 55–135)
ALT SERPL W/O P-5'-P-CCNC: 11 U/L (ref 10–44)
ANION GAP SERPL CALC-SCNC: 6 MMOL/L (ref 8–16)
AST SERPL-CCNC: 18 U/L (ref 10–40)
BASOPHILS # BLD AUTO: 0.05 K/UL (ref 0–0.2)
BASOPHILS NFR BLD: 0.7 % (ref 0–1.9)
BILIRUB SERPL-MCNC: 0.3 MG/DL (ref 0.1–1)
BUN SERPL-MCNC: 14 MG/DL (ref 6–20)
CALCIUM SERPL-MCNC: 8.7 MG/DL (ref 8.7–10.5)
CHLORIDE SERPL-SCNC: 108 MMOL/L (ref 95–110)
CHOLEST SERPL-MCNC: 172 MG/DL (ref 120–199)
CHOLEST/HDLC SERPL: 2.5 {RATIO} (ref 2–5)
CO2 SERPL-SCNC: 23 MMOL/L (ref 23–29)
CREAT SERPL-MCNC: 0.8 MG/DL (ref 0.5–1.4)
DIFFERENTIAL METHOD: ABNORMAL
EOSINOPHIL # BLD AUTO: 0.4 K/UL (ref 0–0.5)
EOSINOPHIL NFR BLD: 5.2 % (ref 0–8)
ERYTHROCYTE [DISTWIDTH] IN BLOOD BY AUTOMATED COUNT: 12.6 % (ref 11.5–14.5)
EST. GFR  (NO RACE VARIABLE): >60 ML/MIN/1.73 M^2
GLUCOSE SERPL-MCNC: 94 MG/DL (ref 70–110)
HCT VFR BLD AUTO: 39.3 % (ref 37–48.5)
HDLC SERPL-MCNC: 68 MG/DL (ref 40–75)
HDLC SERPL: 39.5 % (ref 20–50)
HGB BLD-MCNC: 12.5 G/DL (ref 12–16)
IMM GRANULOCYTES # BLD AUTO: 0.02 K/UL (ref 0–0.04)
IMM GRANULOCYTES NFR BLD AUTO: 0.3 % (ref 0–0.5)
LDLC SERPL CALC-MCNC: 82.2 MG/DL (ref 63–159)
LYMPHOCYTES # BLD AUTO: 3.4 K/UL (ref 1–4.8)
LYMPHOCYTES NFR BLD: 46.2 % (ref 18–48)
MCH RBC QN AUTO: 29.9 PG (ref 27–31)
MCHC RBC AUTO-ENTMCNC: 31.8 G/DL (ref 32–36)
MCV RBC AUTO: 94 FL (ref 82–98)
MONOCYTES # BLD AUTO: 0.6 K/UL (ref 0.3–1)
MONOCYTES NFR BLD: 7.6 % (ref 4–15)
NEUTROPHILS # BLD AUTO: 2.9 K/UL (ref 1.8–7.7)
NEUTROPHILS NFR BLD: 40 % (ref 38–73)
NONHDLC SERPL-MCNC: 104 MG/DL
NRBC BLD-RTO: 0 /100 WBC
PLATELET # BLD AUTO: 217 K/UL (ref 150–450)
PMV BLD AUTO: 11.2 FL (ref 9.2–12.9)
POTASSIUM SERPL-SCNC: 4.4 MMOL/L (ref 3.5–5.1)
PROT SERPL-MCNC: 6.6 G/DL (ref 6–8.4)
RBC # BLD AUTO: 4.18 M/UL (ref 4–5.4)
SODIUM SERPL-SCNC: 137 MMOL/L (ref 136–145)
TRIGL SERPL-MCNC: 109 MG/DL (ref 30–150)
WBC # BLD AUTO: 7.28 K/UL (ref 3.9–12.7)

## 2023-07-07 PROCEDURE — 36415 COLL VENOUS BLD VENIPUNCTURE: CPT | Mod: PN | Performed by: INTERNAL MEDICINE

## 2023-07-07 PROCEDURE — 85025 COMPLETE CBC W/AUTO DIFF WBC: CPT | Performed by: INTERNAL MEDICINE

## 2023-07-07 PROCEDURE — 80061 LIPID PANEL: CPT | Performed by: INTERNAL MEDICINE

## 2023-07-07 PROCEDURE — 80053 COMPREHEN METABOLIC PANEL: CPT | Performed by: INTERNAL MEDICINE

## 2023-11-02 ENCOUNTER — PATIENT MESSAGE (OUTPATIENT)
Dept: OBSTETRICS AND GYNECOLOGY | Facility: CLINIC | Age: 38
End: 2023-11-02

## 2023-11-02 DIAGNOSIS — N93.8 DUB (DYSFUNCTIONAL UTERINE BLEEDING): Primary | ICD-10-CM

## 2023-11-02 RX ORDER — DROSPIRENONE AND ETHINYL ESTRADIOL 0.02-3(28)
1 KIT ORAL DAILY
Qty: 90 TABLET | Refills: 3 | Status: CANCELLED | OUTPATIENT
Start: 2023-11-02 | End: 2024-11-01

## 2023-11-02 RX ORDER — DROSPIRENONE AND ETHINYL ESTRADIOL 0.03MG-3MG
1 KIT ORAL DAILY
Qty: 112 TABLET | Refills: 3 | Status: SHIPPED | OUTPATIENT
Start: 2023-11-02 | End: 2024-03-03 | Stop reason: SDUPTHER

## 2023-11-02 NOTE — TELEPHONE ENCOUNTER
Hi Dr. Harrington,  I am very frustrated with the birth control I am currently on. It is generic manny and I take it continuously but I am having tons of breakthrough bleeding for weeks at a time. This has been going on for years but it seems to be getting worse. (If you remember, this was happening to me without birth control too) I may get a week or 2 with nothing then for 3+ weeks Im crampy and spotting, and its not always just a little. Its still that brown/black old blood and what seems like chunks of shedding.   Maria Antonia taken manny for years and didnt have issues. I started having strange side effects when I started taking the generic a few years back. Is there another birth control that you think might work better for me? Or at least to give it a try? Do you even think another birth control could stop this from happening?   I know there are groups of them that have different ingredients. I took ortho tri cyclen, and I think I tried Lo too, like 20 years ago but started having terrible yeast infections after a few years. Please, anything in that category should probably be out because I am very susceptible to begin with.   Thanks,  Tali  (I wasnt able to fit everything in 1 email)    No other vaginal complaints.  Offered to try Brand name only which she would like to do.

## 2023-11-02 NOTE — TELEPHONE ENCOUNTER
I actually want her to try Loretta. It is a little bit more hormone than manny but basically the same thing. Have her stop and do one week of placebo and then start Loretta continuously. Rx sent.

## 2023-11-15 ENCOUNTER — OFFICE VISIT (OUTPATIENT)
Dept: OBSTETRICS AND GYNECOLOGY | Facility: CLINIC | Age: 38
End: 2023-11-15
Payer: COMMERCIAL

## 2023-11-15 VITALS — WEIGHT: 132.5 LBS | BODY MASS INDEX: 20.15 KG/M2 | SYSTOLIC BLOOD PRESSURE: 118 MMHG | DIASTOLIC BLOOD PRESSURE: 72 MMHG

## 2023-11-15 DIAGNOSIS — N76.0 ACUTE VAGINITIS: Primary | ICD-10-CM

## 2023-11-15 PROCEDURE — 3078F DIAST BP <80 MM HG: CPT | Mod: CPTII,S$GLB,, | Performed by: NURSE PRACTITIONER

## 2023-11-15 PROCEDURE — 3008F PR BODY MASS INDEX (BMI) DOCUMENTED: ICD-10-PCS | Mod: CPTII,S$GLB,, | Performed by: NURSE PRACTITIONER

## 2023-11-15 PROCEDURE — 99999 PR PBB SHADOW E&M-EST. PATIENT-LVL II: CPT | Mod: PBBFAC,,, | Performed by: NURSE PRACTITIONER

## 2023-11-15 PROCEDURE — 3008F BODY MASS INDEX DOCD: CPT | Mod: CPTII,S$GLB,, | Performed by: NURSE PRACTITIONER

## 2023-11-15 PROCEDURE — 3074F PR MOST RECENT SYSTOLIC BLOOD PRESSURE < 130 MM HG: ICD-10-PCS | Mod: CPTII,S$GLB,, | Performed by: NURSE PRACTITIONER

## 2023-11-15 PROCEDURE — 99999 PR PBB SHADOW E&M-EST. PATIENT-LVL II: ICD-10-PCS | Mod: PBBFAC,,, | Performed by: NURSE PRACTITIONER

## 2023-11-15 PROCEDURE — 3074F SYST BP LT 130 MM HG: CPT | Mod: CPTII,S$GLB,, | Performed by: NURSE PRACTITIONER

## 2023-11-15 PROCEDURE — 3078F PR MOST RECENT DIASTOLIC BLOOD PRESSURE < 80 MM HG: ICD-10-PCS | Mod: CPTII,S$GLB,, | Performed by: NURSE PRACTITIONER

## 2023-11-15 PROCEDURE — 99213 OFFICE O/P EST LOW 20 MIN: CPT | Mod: S$GLB,,, | Performed by: NURSE PRACTITIONER

## 2023-11-15 PROCEDURE — 99213 PR OFFICE/OUTPT VISIT, EST, LEVL III, 20-29 MIN: ICD-10-PCS | Mod: S$GLB,,, | Performed by: NURSE PRACTITIONER

## 2023-11-15 PROCEDURE — 81514 NFCT DS BV&VAGINITIS DNA ALG: CPT | Performed by: NURSE PRACTITIONER

## 2023-11-15 NOTE — PROGRESS NOTES
CC: Vaginal Discharge    Tali Brice is a 38 y.o. female No obstetric history on file. presents with complaint of vulvar irritation and burning over the past week. Discomfort is most evidence after urination/wiping with burning sensation activated. Also noted it occurred post exercise. History of recurrent yeast infections. Frequent baths with use of bath products, especially post exercise. Symptoms have improved in the past few days.     ROS:  GENERAL: No fever, chills, fatigability or weight loss.  VULVAR: No pain, no lesions and no itching.  VAGINAL: No relaxation, no itching, no discharge, no abnormal bleeding and no lesions.  ABDOMEN: No abdominal pain. Denies nausea. Denies vomiting. No diarrhea. No constipation  BREAST: Denies pain. No lumps. No discharge.  URINARY: No incontinence, no nocturia, no frequency and no dysuria.  CARDIOVASCULAR: No chest pain. No shortness of breath. No leg cramps.  NEUROLOGICAL: No headaches. No vision changes.    PHYSICAL EXAM:  VULVA: normal appearing vulva with no masses, tenderness or lesions   VAGINA: normal appearing vagina with normal color and moderate amount of thin, white discharge, no lesions   CERVIX: normal appearing cervix without discharge or lesions   UTERUS: uterus is normal size, shape, consistency and nontender   ADNEXA: normal adnexa in size, nontender and no masses    ASSESSMENT and PLAN:    ICD-10-CM ICD-9-CM    1. Acute vaginitis  N76.0 616.10 Vaginosis Screen by DNA Probe        Affirm performed. Desires Metrogel script if BV+.    Patient was counseled today on vaginitis prevention including :  a. avoiding feminine products such as deoderant soaps, body wash, bubble bath, douches, scented toilet paper, deoderant tampons or pads, feminine wipes, chronic pad use, etc.  b. avoiding other vulvovaginal irritants such as long hot baths, humidity, tight, synthetic clothing, chlorine and sitting around in wet bathing suits  c. wearing cotton underwear,  avoiding thong underwear and no underwear to bed  d. taking showers instead of baths and use a hair dryer on cool setting afterwards to dry  e. wearing cotton to exercise and shower immediately after exercise and change clothes  f. using polyurethane condoms without spermicide if sexually active and symptoms are triggered by intercourse    FOLLOW UP: PRN lack of improvement.       Annetta Ching, FNP-C

## 2023-11-16 LAB
BACTERIAL VAGINOSIS DNA: NEGATIVE
CANDIDA GLABRATA DNA: NEGATIVE
CANDIDA KRUSEI DNA: NEGATIVE
CANDIDA RRNA VAG QL PROBE: NEGATIVE
T VAGINALIS RRNA GENITAL QL PROBE: NEGATIVE

## 2024-04-25 ENCOUNTER — OFFICE VISIT (OUTPATIENT)
Dept: OBSTETRICS AND GYNECOLOGY | Facility: CLINIC | Age: 39
End: 2024-04-25
Payer: COMMERCIAL

## 2024-04-25 VITALS
BODY MASS INDEX: 20.01 KG/M2 | DIASTOLIC BLOOD PRESSURE: 76 MMHG | HEIGHT: 68 IN | WEIGHT: 132.06 LBS | SYSTOLIC BLOOD PRESSURE: 106 MMHG

## 2024-04-25 DIAGNOSIS — Z12.31 ENCOUNTER FOR SCREENING MAMMOGRAM FOR BREAST CANCER: Primary | ICD-10-CM

## 2024-04-25 DIAGNOSIS — Z01.419 ENCOUNTER FOR GYNECOLOGICAL EXAMINATION (GENERAL) (ROUTINE) WITHOUT ABNORMAL FINDINGS: ICD-10-CM

## 2024-04-25 DIAGNOSIS — N93.8 DUB (DYSFUNCTIONAL UTERINE BLEEDING): ICD-10-CM

## 2024-04-25 PROCEDURE — 99396 PREV VISIT EST AGE 40-64: CPT | Mod: S$GLB,,, | Performed by: OBSTETRICS & GYNECOLOGY

## 2024-04-25 PROCEDURE — 3078F DIAST BP <80 MM HG: CPT | Mod: CPTII,S$GLB,, | Performed by: OBSTETRICS & GYNECOLOGY

## 2024-04-25 PROCEDURE — 99999 PR PBB SHADOW E&M-EST. PATIENT-LVL III: CPT | Mod: PBBFAC,,, | Performed by: OBSTETRICS & GYNECOLOGY

## 2024-04-25 PROCEDURE — 1159F MED LIST DOCD IN RCRD: CPT | Mod: CPTII,S$GLB,, | Performed by: OBSTETRICS & GYNECOLOGY

## 2024-04-25 PROCEDURE — 1160F RVW MEDS BY RX/DR IN RCRD: CPT | Mod: CPTII,S$GLB,, | Performed by: OBSTETRICS & GYNECOLOGY

## 2024-04-25 PROCEDURE — 3008F BODY MASS INDEX DOCD: CPT | Mod: CPTII,S$GLB,, | Performed by: OBSTETRICS & GYNECOLOGY

## 2024-04-25 PROCEDURE — 3074F SYST BP LT 130 MM HG: CPT | Mod: CPTII,S$GLB,, | Performed by: OBSTETRICS & GYNECOLOGY

## 2024-04-25 RX ORDER — DROSPIRENONE AND ETHINYL ESTRADIOL 0.03MG-3MG
1 KIT ORAL DAILY
Qty: 112 TABLET | Refills: 3 | Status: SHIPPED | OUTPATIENT
Start: 2024-04-25

## 2024-04-25 NOTE — PROGRESS NOTES
Subjective:       Patient ID: Tali Brice is a 39 y.o. female.    Chief Complaint:  Annual Exam (Last pap/hpv: 3/2023 Normal ; Last mm2021 Birads: 1 )        History of Present Illness  Tali Brice is a 39 y.o. female  who presents for annual. Was doing well on Loretta continuously and had no period for about 5 months and then had BTB for 8 days of full period. Discussed continuous pills. Recommend stop pill for 5-7 days to have withdrawal bleed and then restart continuously. If BTB again then call and we can check GYN u/s. Patient agrees. All questions answered. MMG ordered.     No LMP recorded. (Menstrual status: Birth Control).   Date of Last Pap: 3/18/2022    Review of Systems  Review of Systems   Constitutional:  Negative for chills and fever.        Objective:   Physical Exam:   Constitutional: She is oriented to person, place, and time. Vital signs are normal. She appears well-developed and well-nourished. No distress.        Pulmonary/Chest: She exhibits no mass. Right breast exhibits no mass, no nipple discharge, no skin change, no tenderness, no bleeding and no swelling. Left breast exhibits no mass, no nipple discharge, no skin change, no tenderness, no bleeding and no swelling. Breasts are symmetrical.        Abdominal: Soft. Bowel sounds are normal. She exhibits no distension and no mass. There is no abdominal tenderness. There is no rebound.     Genitourinary:    Vagina and uterus normal.   There is no rash, tenderness, lesion or injury on the right labia. There is no rash, tenderness, lesion or injury on the left labia. Cervix is normal. Right adnexum displays no mass, no tenderness and no fullness. Left adnexum displays no mass, no tenderness and no fullness. No erythema, vaginal discharge, tenderness, rectocele, cystocele or prolapse of vaginal walls in the vagina. Cervix exhibits no motion tenderness, no discharge and no friability. Uterus is not deviated, not enlarged, not  fixed, not tender and not hosting fibroids.           Musculoskeletal: Normal range of motion and moves all extremeties.      Lymphadenopathy:        Right: No supraclavicular adenopathy present.        Left: No supraclavicular adenopathy present.    Neurological: She is alert and oriented to person, place, and time.    Skin: Skin is warm and dry.    Psychiatric: She has a normal mood and affect. Her behavior is normal. Judgment normal.        Assessment/ Plan:     1. Encounter for screening mammogram for breast cancer  Mammo Digital Screening Bilat w/ Delio      2. Encounter for gynecological examination (general) (routine) without abnormal findings        3. DUB (dysfunctional uterine bleeding)  drospirenone-ethinyl estradioL (JOSE R) 3-0.03 mg per tablet          No follow-ups on file.    As of April 1, 2021, the Cures Act has been passed nationally. This new law requires that all doctors progress notes, lab results, pathology reports and radiology reports be released IMMEDIATELY to the patient in the patient portal. That means that the results are released to you at the EXACT same time they are released to me. Therefore, with all of the patients that I have I am not able to reply to each patient exactly when the results come in. So there will be a delay from when you see the results to when I see them and have time to come up with a response to send you. Also I only see these results when I am on the computer at work. So if the results come in over the weekend or after 5 pm of a work day, I will not see them until the next business day. As you can tell, this is a challenge as a physician to give every patient the quick response they hope for and deserve. So please be patient! Thanks for understanding, Dr. Harrington

## 2024-05-23 ENCOUNTER — PATIENT MESSAGE (OUTPATIENT)
Dept: OBSTETRICS AND GYNECOLOGY | Facility: CLINIC | Age: 39
End: 2024-05-23
Payer: COMMERCIAL

## 2024-05-24 RX ORDER — FLUCONAZOLE 150 MG/1
150 TABLET ORAL DAILY
Qty: 1 TABLET | Refills: 1 | Status: SHIPPED | OUTPATIENT
Start: 2024-05-24 | End: 2024-05-28

## 2024-10-22 ENCOUNTER — PATIENT MESSAGE (OUTPATIENT)
Dept: OBSTETRICS AND GYNECOLOGY | Facility: CLINIC | Age: 39
End: 2024-10-22
Payer: COMMERCIAL

## 2024-10-23 RX ORDER — DROSPIRENONE AND ETHINYL ESTRADIOL 0.02-3(28)
1 KIT ORAL DAILY
Qty: 84 TABLET | Refills: 2 | Status: SHIPPED | OUTPATIENT
Start: 2024-10-23 | End: 2025-10-23

## 2025-03-09 ENCOUNTER — PATIENT MESSAGE (OUTPATIENT)
Dept: OBSTETRICS AND GYNECOLOGY | Facility: CLINIC | Age: 40
End: 2025-03-09
Payer: COMMERCIAL

## 2025-03-10 RX ORDER — DROSPIRENONE AND ETHINYL ESTRADIOL 0.03MG-3MG
1 KIT ORAL DAILY
Qty: 112 TABLET | Refills: 0 | Status: SHIPPED | OUTPATIENT
Start: 2025-03-10 | End: 2026-03-10

## 2025-05-02 ENCOUNTER — OFFICE VISIT (OUTPATIENT)
Dept: OBSTETRICS AND GYNECOLOGY | Facility: CLINIC | Age: 40
End: 2025-05-02
Payer: COMMERCIAL

## 2025-05-02 VITALS
BODY MASS INDEX: 19.37 KG/M2 | SYSTOLIC BLOOD PRESSURE: 104 MMHG | WEIGHT: 123.69 LBS | DIASTOLIC BLOOD PRESSURE: 70 MMHG

## 2025-05-02 DIAGNOSIS — Z11.51 ENCOUNTER FOR SCREENING FOR HUMAN PAPILLOMAVIRUS (HPV): ICD-10-CM

## 2025-05-02 DIAGNOSIS — Z01.419 ENCOUNTER FOR GYNECOLOGICAL EXAMINATION (GENERAL) (ROUTINE) WITHOUT ABNORMAL FINDINGS: ICD-10-CM

## 2025-05-02 DIAGNOSIS — N93.8 DUB (DYSFUNCTIONAL UTERINE BLEEDING): ICD-10-CM

## 2025-05-02 DIAGNOSIS — Z12.4 ENCOUNTER FOR PAPANICOLAOU SMEAR FOR CERVICAL CANCER SCREENING: ICD-10-CM

## 2025-05-02 DIAGNOSIS — Z12.31 BREAST CANCER SCREENING BY MAMMOGRAM: Primary | ICD-10-CM

## 2025-05-02 PROCEDURE — 99999 PR PBB SHADOW E&M-EST. PATIENT-LVL III: CPT | Mod: PBBFAC,,, | Performed by: OBSTETRICS & GYNECOLOGY

## 2025-05-02 RX ORDER — DROSPIRENONE AND ETHINYL ESTRADIOL 0.03MG-3MG
1 KIT ORAL DAILY
Qty: 112 TABLET | Refills: 3 | Status: SHIPPED | OUTPATIENT
Start: 2025-05-02 | End: 2026-05-02

## 2025-05-02 NOTE — PROGRESS NOTES
Subjective:       Patient ID: Tali Brice is a 40 y.o. female.    Chief Complaint:  Well Woman (Last pap and hpv: 2023 (abnormal pap/normal hpv) /Last mammogram: 2024 Birads ! TC 9.67%)        History of Present Illness  Tali Brice is a 40 y.o. female  who presents for annual. Overall doing well on continuous Loretta. All questions answered. No gyn concerns.     Patient's last menstrual period was 2025.   Date of Last Pap: 4/3/2023    Review of Systems  Review of Systems   Constitutional:  Negative for chills and fever.        Objective:   Physical Exam:   Constitutional: She is oriented to person, place, and time. Vital signs are normal. She appears well-developed and well-nourished. No distress.        Pulmonary/Chest: She exhibits no mass. Right breast exhibits no mass, no nipple discharge, no skin change, no tenderness, no bleeding and no swelling. Left breast exhibits no mass, no nipple discharge, no skin change, no tenderness, no bleeding and no swelling. Breasts are symmetrical.        Abdominal: Soft. Bowel sounds are normal. She exhibits no distension and no mass. There is no abdominal tenderness. There is no rebound.     Genitourinary:    Vagina and uterus normal.   There is no rash, tenderness, lesion or injury on the right labia. There is no rash, tenderness, lesion or injury on the left labia. Cervix is normal. Right adnexum displays no mass, no tenderness and no fullness. Left adnexum displays no mass, no tenderness and no fullness. No erythema, vaginal discharge, tenderness, rectocele, cystocele or prolapse of vaginal walls in the vagina. Cervix exhibits no motion tenderness, no discharge and no friability. Uterus is not deviated, not enlarged, not fixed, not tender and not hosting fibroids.           Musculoskeletal: Normal range of motion and moves all extremeties.      Lymphadenopathy:        Right: No supraclavicular adenopathy present.        Left: No  supraclavicular adenopathy present.    Neurological: She is alert and oriented to person, place, and time.    Skin: Skin is warm and dry.    Psychiatric: She has a normal mood and affect. Her behavior is normal. Judgment normal.        Assessment/ Plan:     1. Breast cancer screening by mammogram  Mammo Digital Screening Bilat w/ Delio ([facility])      2. Encounter for Papanicolaou smear for cervical cancer screening  CANCELED: Liquid-Based Pap Smear, Screening      3. Encounter for screening for human papillomavirus (HPV)  CANCELED: Liquid-Based Pap Smear, Screening      4. DUB (dysfunctional uterine bleeding)  drospirenone-ethinyl estradioL (JOSE R, 28,) 3-0.03 mg per tablet      5. Encounter for gynecological examination (general) (routine) without abnormal findings            No follow-ups on file.    As of April 1, 2021, the Cures Act has been passed nationally. This new law requires that all doctors progress notes, lab results, pathology reports and radiology reports be released IMMEDIATELY to the patient in the patient portal. That means that the results are released to you at the EXACT same time they are released to me. Therefore, with all of the patients that I have I am not able to reply to each patient exactly when the results come in. So there will be a delay from when you see the results to when I see them and have time to come up with a response to send you. Also I only see these results when I am on the computer at work. So if the results come in over the weekend or after 5 pm of a work day, I will not see them until the next business day. As you can tell, this is a challenge as a physician to give every patient the quick response they hope for and deserve. So please be patient! Thanks for understanding, Dr. Harrington